# Patient Record
Sex: MALE | Race: WHITE | Employment: PART TIME | URBAN - METROPOLITAN AREA
[De-identification: names, ages, dates, MRNs, and addresses within clinical notes are randomized per-mention and may not be internally consistent; named-entity substitution may affect disease eponyms.]

---
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
## 2023-01-12 ENCOUNTER — OFFICE VISIT (OUTPATIENT)
Dept: FAMILY MEDICINE CLINIC | Age: 18
End: 2023-01-12
Payer: COMMERCIAL

## 2023-01-12 VITALS
WEIGHT: 260 LBS | SYSTOLIC BLOOD PRESSURE: 130 MMHG | HEART RATE: 86 BPM | OXYGEN SATURATION: 97 % | RESPIRATION RATE: 18 BRPM | TEMPERATURE: 97.7 F | DIASTOLIC BLOOD PRESSURE: 84 MMHG | BODY MASS INDEX: 35.21 KG/M2 | HEIGHT: 72 IN

## 2023-01-12 DIAGNOSIS — L03.032 PARONYCHIA OF GREAT TOE OF LEFT FOOT: ICD-10-CM

## 2023-01-12 DIAGNOSIS — L60.0 INGROWN LEFT GREATER TOENAIL: Primary | ICD-10-CM

## 2023-01-12 PROCEDURE — 99213 OFFICE O/P EST LOW 20 MIN: CPT | Performed by: PHYSICIAN ASSISTANT

## 2023-01-12 RX ORDER — DOXYCYCLINE HYCLATE 100 MG
100 TABLET ORAL 2 TIMES DAILY
Qty: 20 TABLET | Refills: 0 | Status: SHIPPED
Start: 2023-01-12 | End: 2023-01-13

## 2023-01-12 NOTE — PROGRESS NOTES
21  Rehan Smith : 2005 Sex: male  Age 21 y.o. Subjective:  Chief Complaint   Patient presents with    Foot Pain     Left Big toe bloody now swelling         HPI:   Mulugeta Meehan , 21 y.o. male presents to express care for evaluation of left great toe pain    HPI  78-year-old male presents to express care for evaluation of left great toe pain. The patient has now had the symptoms ongoing for last several days. The patient has been using Epson salts at home does not seem to be helping. The patient has had some drainage and discharge of the left great toe. The patient denies any numbness or tingling. The patient is not having any significant ankle pain or other injuries at this time. ROS:   Unless otherwise stated in this report the patient's positive and negative responses for review of systems for constitutional, eyes, ENT, cardiovascular, respiratory, gastrointestinal, neurological, , musculoskeletal, and integument systems and related systems to the presenting problem are either stated in the history of present illness or were not pertinent or were negative for the symptoms and/or complaints related to the presenting medical problem. Positives and pertinent negatives as per HPI. All others reviewed and are negative. PMH:     Past Medical History:   Diagnosis Date    Fatty liver     Seasonal allergies        No past surgical history on file. Family History   Problem Relation Age of Onset    Leukemia Maternal Grandmother     Heart Failure Maternal Grandmother     Lung Cancer Maternal Grandmother     Breast Cancer Maternal Grandmother     Colon Cancer Maternal Grandfather        Medications:     Current Outpatient Medications:     doxycycline hyclate (VIBRA-TABS) 100 MG tablet, Take 1 tablet by mouth 2 times daily for 10 days, Disp: 20 tablet, Rfl: 0    mupirocin (BACTROBAN) 2 % ointment, Apply topically 3 times daily. , Disp: 30 g, Rfl: 0    acetaminophen (TYLENOL) 500 MG tablet, Take 1 tablet by mouth 4 times daily as needed for Pain, Disp: 120 tablet, Rfl: 0    fluticasone (FLONASE) 50 MCG/ACT nasal spray, 1 spray by Each Nostril route daily, Disp: 32 g, Rfl: 1    levocetirizine (XYZAL) 5 MG tablet, Take 1 tablet by mouth nightly, Disp: 30 tablet, Rfl: 11    EPINEPHrine (EPIPEN 2-JENNIFER) 0.3 MG/0.3ML SOAJ injection, Inject 0.3 mLs into the muscle once for 1 dose Use as directed for allergic reaction, Disp: 0.3 mL, Rfl: 1    Allergies:   No Known Allergies    Social History:     Social History     Tobacco Use    Smoking status: Never    Smokeless tobacco: Never   Substance Use Topics    Alcohol use: Never    Drug use: Never       Patient lives at home. Physical Exam:     Vitals:    01/12/23 1534   BP: 130/84   Site: Right Upper Arm   Position: Sitting   Cuff Size: Medium Adult   Pulse: 86   Resp: 18   Temp: 97.7 °F (36.5 °C)   TempSrc: Temporal   SpO2: 97%   Weight: (!) 260 lb (117.9 kg)   Height: 6' (1.829 m)       Exam:  Physical Exam  Vital signs reviewed and nurse's notes. The patient is not hypoxic. General: Alert, no acute distress, patient resting comfortably   Skin: warm, intact, no pallor noted   Head: Normocephalic, atraumatic   Eye: Normal conjunctiva   Respiratory: No acute distress   Musculoskeletal: The patient has evidence of a ingrown toenail to the medial and lateral aspect of the toe. The patient does have evidence of some discharge noted on the medial nail fold area. There is quite a bit of erythema around the medial and proximal.  There is no significant tenderness noted on the lateral aspect. The patient has no evidence of lymphangitic streaking. Neurological: alert and orient x4, normal sensory and motor observed. Psychiatric: Cooperative      Testing:           Medical Decision Making:     Vital signs reviewed    Past medical history reviewed. Allergies reviewed. Medications reviewed.     Patient on arrival does not appear to be in any apparent distress or discomfort. The patient has been seen and evaluated. The patient does not appear to be toxic or lethargic. The patient has no obvious deformity noted to the left great toe or to the left foot. The patient does have quite a bit of swelling and erythema noted to the nail fold on the proximal and medial aspect. The patient does have evidence of some discharge noted in the medial aspect of the nail fold. The patient does have evidence of ingrown toenails. Patient pulses are intact. The patient has no lymphangitic streaking. The patient will be treated with doxycycline. The patient will be given mupirocin. We reached out to podiatry and they could see the patient tomorrow at 9:30 AM.    The patient is to return to express care or go directly to the emergency department should any of the signs or symptoms worsen. The patient is to followup with primary care physician in 2-3 days for repeat evaluation. The patient has no other questions or concerns at this time the patient will be discharged home. Clinical Impression:   Maria D Galarza was seen today for foot pain. Diagnoses and all orders for this visit:    Ingrown left greater toenail  -     Christelle Davis DPM, Podiatry, [address] [place] [address]    Paronychia of great toe of left foot    Other orders  -     doxycycline hyclate (VIBRA-TABS) 100 MG tablet; Take 1 tablet by mouth 2 times daily for 10 days  -     mupirocin (BACTROBAN) 2 % ointment; Apply topically 3 times daily. The patient is to call for any concerns or return if any of the signs or symptoms worsen. The patient is to follow-up with PCP in the next 2-3 days for repeat evaluation repeat assessment or go directly to the emergency department.      SIGNATURE: Safia Wolfe III, PA-C

## 2023-01-13 ENCOUNTER — OFFICE VISIT (OUTPATIENT)
Dept: PODIATRY | Age: 18
End: 2023-01-13

## 2023-01-13 ENCOUNTER — OFFICE VISIT (OUTPATIENT)
Dept: FAMILY MEDICINE CLINIC | Age: 18
End: 2023-01-13
Payer: COMMERCIAL

## 2023-01-13 VITALS
TEMPERATURE: 96.9 F | HEART RATE: 78 BPM | DIASTOLIC BLOOD PRESSURE: 72 MMHG | OXYGEN SATURATION: 97 % | SYSTOLIC BLOOD PRESSURE: 118 MMHG

## 2023-01-13 DIAGNOSIS — L03.032 CELLULITIS OF LEFT TOE: ICD-10-CM

## 2023-01-13 DIAGNOSIS — M79.675 PAIN OF LEFT GREAT TOE: ICD-10-CM

## 2023-01-13 DIAGNOSIS — L60.0 INGROWN NAIL: Primary | ICD-10-CM

## 2023-01-13 DIAGNOSIS — F41.0 PANIC ANXIETY SYNDROME: ICD-10-CM

## 2023-01-13 DIAGNOSIS — R55 VASOVAGAL EPISODE: Primary | ICD-10-CM

## 2023-01-13 DIAGNOSIS — R55 VASOVAGAL SYNCOPE: ICD-10-CM

## 2023-01-13 PROCEDURE — 99214 OFFICE O/P EST MOD 30 MIN: CPT | Performed by: INTERNAL MEDICINE

## 2023-01-13 ASSESSMENT — ENCOUNTER SYMPTOMS
ABDOMINAL PAIN: 0
COLOR CHANGE: 0
SHORTNESS OF BREATH: 1
EYES NEGATIVE: 1
VOMITING: 0
COUGH: 0
WHEEZING: 0
DIARRHEA: 0
NAUSEA: 1

## 2023-01-13 NOTE — PROGRESS NOTES
Patient in office with c/o ingrown toenail left great toe. Seen in [facility] care yesterday. Given abx at apt yesterday has not started yet. 21  Rehan Smith : 2005 Sex: male  Age: 21 y.o. Patient was referred by Prasanna Ram DO    CC:    Painful ingrown left great toenail both inside and outside borders    HPI:   This 23-year-old male patient referred today painful ingrown left great toenail both on the inside and outside. Was seen in [facility] and was started on oral antibiotic. States did not start his oral antibiotic yet. Does present with his mother. 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 Does have some drainage and redness. Symptoms present for the past week. Denies any right foot pain. Denies any known allergies. No additional pedal complaints at this time. ROS:  Const: Denies constitutional symptoms  Musculo: Denies symptoms other than stated above  Skin: Denies symptoms other than stated above       Current Outpatient Medications:     doxycycline hyclate (VIBRA-TABS) 100 MG tablet, Take 1 tablet by mouth 2 times daily for 10 days, Disp: 20 tablet, Rfl: 0    mupirocin (BACTROBAN) 2 % ointment, Apply topically 3 times daily. , Disp: 30 g, Rfl: 0    acetaminophen (TYLENOL) 500 MG tablet, Take 1 tablet by mouth 4 times daily as needed for Pain, Disp: 120 tablet, Rfl: 0    fluticasone (FLONASE) 50 MCG/ACT nasal spray, 1 spray by Each Nostril route daily, Disp: 32 g, Rfl: 1    levocetirizine (XYZAL) 5 MG tablet, Take 1 tablet by mouth nightly, Disp: 30 tablet, Rfl: 11    EPINEPHrine (EPIPEN 2-JENNIFER) 0.3 MG/0.3ML SOAJ injection, Inject 0.3 mLs into the muscle once for 1 dose Use as directed for allergic reaction, Disp: 0.3 mL, Rfl: 1  No Known Allergies    Past Medical History:   Diagnosis Date    Fatty liver     Seasonal allergies            There were no vitals filed for this visit.     Work History/Social History: Foot and ankle history:     Focused Lower Extremity Physical Exam:    Neurovascular examination:    Dorsalis Pedis palpable bilateral.  Posterior tibialis palpable bilateral.    Capillary Refill Time:  Immediate return  Hair growth:  Symmetrical and bilateral   Skin:  Not atrophic  Edema: No edema bilateral feet or ankles. Neurologic:  Light touch intact bilateral.      Musculoskeletal/ Orthopedic examination:    Equinis: Absent bilateral  Dorsiflexion, plantarflexion, inversion, eversion bilateral 5 out of 5 muscle strength  Wiggling toes  Negative Homans  Tenderness palpation both medial lateral border left great toe    Dermatology examination:    Incurvated and ingrown medial lateral border left great toenail. Erythema and drainage medial lateral border left great toe noted. Assessment and Plan:  Stephanie Cespedes was seen today for ingrown toenail. Diagnoses and all orders for this visit:    Ingrown nail    Cellulitis of left toe    Pain of left great toe    Seen today new referral ingrown and infected left great toenail medial lateral border  Mother present during entire visit. After verbal consent for nail avulsion, alcohol prep was used ethyl chloride used over injection site with 6cc 1% lidocaine plain injected dorsal approach medial and lateral left great toe in standard fashion. Betadine prep was used over the left great toe, tourniquet was applied. Sterile instrumentation was used with a freer to free the medial and lateral border of the ingrown portion of the toenail. English anvil used in standard linear technique to cut the ingrown portion of the nail. Hemostat was used to remove the ingrown portion of the toenail and passed to the back table. Phenol applied in 3 separate 30 second intervals in standard fashion at the base of the ingrown toenail. Alcohol was used to wash the entire digit. Patient's mother was in the room during entire visit.   He was very close to passing out after I took the nail out. We did lay him back in the chair and did give him water. Tourniquet was released. Postoperative instructions were discussed in detail. Keep dressing clean dry and intact until tonight. Remove bandage and apply bacitracin with band-aid each day for 1 week. After 1 week apply band-aid and continue to keep clean dry and intact. Avoid excess soaking and avoid pool or lake water. Limited activity over the next 24-48 hours. Oral antibiotic as prescribed yesterday from [facility]. Follow-up 2 weeks. Call sooner with any questions of concerns or any signs on increase drainage or redness. Patient did stand up and did sit on the floor in the hallway upon leaving. He did not pass out in the hallway but he did sit back down. Mother states he does have a history of \"panic attacks. \"  Dr. Arianna Martell was kind enough to come over and add him onto the schedule to see him today. Appreciate his input very much going forward. Return in about 2 weeks (around 1/27/2023). Seen By:  Dallin De Leon DPM      Document was created using voice recognition software. Note was reviewed, however may contain grammatical errors.

## 2023-01-14 NOTE — PROGRESS NOTES
[address] [place]     21  Rehan Smith : 2005 Sex: male  Age: 21 y.o. Chief Complaint   Patient presents with    Loss of Consciousness     Ingrown toenail removed by Dr Malcolm Bates       HPI    Patient comes to [facility] today after having seen podiatry and had ingrown toenail removed from left great toe when he developed lightheadedness and presyncope. I was asked to come see the patient. He was laying on the ground he was very lightheaded at first would not even speak to me although he was looking at his phone between his legs. He was breathing okay and vital signs were stable. I Tried to talk to him but he would not answer me and I finally told him if he was not going to do so I was calling an ambulance. He then started talking and cooperating. His mother was there as well who provided some history moral support for the patient. States that he has had allergic reactions in the past to unknown substances (not medication) for which he has had to use an EpiPen. Patient was initially hyperventilating stating that he was somewhat short of breath and was having some chest pain. Seemed to be very dramatic. We got him to calm down. We took him to the back and put him in the room. After he sat and relaxed and calm down. He was then doing fine. No significant medical history outside of what sounds like seasonal allergies and some allergic reactions that he admits to. Review of Systems   Constitutional:  Negative for chills, fatigue and fever. HENT: Negative. Eyes: Negative. Respiratory:  Positive for shortness of breath. Negative for cough and wheezing. Brief and saturating in the 90s the whole time   Cardiovascular:  Positive for chest pain. Negative for palpitations. Brief   Gastrointestinal:  Positive for nausea. Negative for abdominal pain, diarrhea and vomiting. Endocrine: Negative. Genitourinary: Negative. Musculoskeletal: Negative.     Skin: Negative for color change. Recent toenail removed   Neurological:  Positive for dizziness, syncope, weakness and light-headedness. Negative for tremors, seizures, speech difficulty, numbness and headaches. REST OF PERTINENT ROS GONE OVER AND WAS NEGATIVE. Current Outpatient Medications:     fluticasone (FLONASE) 50 MCG/ACT nasal spray, 1 spray by Each Nostril route daily, Disp: 32 g, Rfl: 1    levocetirizine (XYZAL) 5 MG tablet, Take 1 tablet by mouth nightly, Disp: 30 tablet, Rfl: 11    EPINEPHrine (EPIPEN 2-JENNIFER) 0.3 MG/0.3ML SOAJ injection, Inject 0.3 mLs into the muscle once for 1 dose Use as directed for allergic reaction, Disp: 0.3 mL, Rfl: 1  No Known Allergies    Past Medical History:   Diagnosis Date    Fatty liver     Seasonal allergies      No past surgical history on file.   Family History   Problem Relation Age of Onset    Leukemia Maternal Grandmother     Heart Failure Maternal Grandmother     Lung Cancer Maternal Grandmother     Breast Cancer Maternal Grandmother     Colon Cancer Maternal Grandfather      Social History     Socioeconomic History    Marital status: Single     Spouse name: Not on file    Number of children: Not on file    Years of education: Not on file    Highest education level: Not on file   Occupational History    Not on file   Tobacco Use    Smoking status: Never    Smokeless tobacco: Never   Substance and Sexual Activity    Alcohol use: Never    Drug use: Never    Sexual activity: Not Currently   Other Topics Concern    Not on file   Social History Narrative    Not on file     Social Determinants of Health     Financial Resource Strain: Low Risk     Difficulty of Paying Living Expenses: Not hard at all   Food Insecurity: No Food Insecurity    Worried About Running Out of Food in the Last Year: Never true    Ran Out of Food in the Last Year: Never true   Transportation Needs: Not on file   Physical Activity: Not on file   Stress: Not on file Social Connections: Not on file   Intimate Partner Violence: Not on file   Housing Stability: Not on file       Vitals:    01/13/23 1031 01/13/23 1057   BP: 108/78 118/72   Pulse: 78    Temp: 96.9 °F (36.1 °C)    SpO2: 97%        Physical Exam  Vitals and nursing note reviewed. Constitutional:       General: He is in acute distress. Comments: Emotional distress panic and anxiety   HENT:      Head: Normocephalic. Right Ear: Tympanic membrane, ear canal and external ear normal.      Left Ear: Tympanic membrane, ear canal and external ear normal.      Mouth/Throat:      Mouth: Mucous membranes are moist.      Pharynx: Oropharynx is clear. No posterior oropharyngeal erythema. Eyes:      Extraocular Movements: Extraocular movements intact. Pupils: Pupils are equal, round, and reactive to light. Cardiovascular:      Rate and Rhythm: Normal rate and regular rhythm. Pulses: Normal pulses. Heart sounds: Normal heart sounds. No murmur heard. Pulmonary:      Effort: Pulmonary effort is normal. No respiratory distress. Breath sounds: Normal breath sounds. No wheezing, rhonchi or rales. Abdominal:      General: Bowel sounds are normal.      Palpations: Abdomen is soft. Tenderness: There is no abdominal tenderness. Musculoskeletal:         General: No swelling, tenderness or signs of injury. Normal range of motion. Cervical back: Normal range of motion and neck supple. No tenderness. Skin:     General: Skin is warm and dry. Findings: No bruising, erythema or rash. Neurological:      General: No focal deficit present. Mental Status: He is alert and oriented to person, place, and time. Cranial Nerves: No cranial nerve deficit. Sensory: No sensory deficit. Motor: No weakness. Gait: Gait normal.   Psychiatric:      Comments: Very emotional and anxious. Assessment and Plan:  Tiffany Padron was seen today for loss of consciousness.     Diagnoses and all orders for this visit:    Vasovagal episode    Vasovagal syncope    Panic anxiety syndrome  Plan: I explained to both patient and mother felt this is most likely a vasovagal event related to his toenail removal.   Was fine at the end of examination and I walked him around the halls without any difficulty. I did not feel at this time further evaluation was needed. Simply asked the mother to keep an eye on him for the next 24 hours. To the emergency room with any further similar symptoms. Follow-up with PCP and notify us with problems in the interim. Encounter today with face-to-face timeIncluding reviewing everything with he and his mother, reviewing chart and medications as well as discussion with the podiatrist who took his toenail off regarding the case was 35 minutes. Return for fu pcp. Seen By:  Musa Perez MD      *Document was created using voice recognition software. Note was reviewed however may contain grammatical errors.

## 2023-02-23 ENCOUNTER — OFFICE VISIT (OUTPATIENT)
Dept: FAMILY MEDICINE CLINIC | Age: 18
End: 2023-02-23

## 2023-02-23 VITALS
WEIGHT: 258 LBS | OXYGEN SATURATION: 96 % | BODY MASS INDEX: 34.95 KG/M2 | SYSTOLIC BLOOD PRESSURE: 130 MMHG | RESPIRATION RATE: 20 BRPM | DIASTOLIC BLOOD PRESSURE: 92 MMHG | TEMPERATURE: 97.7 F | HEART RATE: 84 BPM | HEIGHT: 72 IN

## 2023-02-23 DIAGNOSIS — R09.81 NASAL CONGESTION: ICD-10-CM

## 2023-02-23 DIAGNOSIS — R07.0 PAIN IN THROAT: Primary | ICD-10-CM

## 2023-02-23 DIAGNOSIS — R42 LIGHTHEADED: ICD-10-CM

## 2023-02-23 LAB
Lab: NORMAL
PERFORMING INSTRUMENT: NORMAL
QC PASS/FAIL: NORMAL
S PYO AG THROAT QL: NORMAL
SARS-COV-2, POC: NORMAL

## 2023-02-23 NOTE — PROGRESS NOTES
21  Rehan Luis : 2005 Sex: male  Age 21 y.o. Subjective:  Chief Complaint   Patient presents with    Dizziness    Pharyngitis    Congestion         HPI:   [de-identified] , 21 y.o. male presents to express care for evaluation of dizziness, lightheaded, congestion    HPI  58-year-old male presents to express care for evaluation of congestion, drainage, lightheadedness. The patient has had these symptoms essentially for couple of hours. The patient was at work and started feeling lightheaded and dizzy. The patient started having a sore throat and congestion. No fever, chills. The patient is not really abdominal pain. No GI symptoms. The patient not currently on any antibiotics. ROS:   Unless otherwise stated in this report the patient's positive and negative responses for review of systems for constitutional, eyes, ENT, cardiovascular, respiratory, gastrointestinal, neurological, , musculoskeletal, and integument systems and related systems to the presenting problem are either stated in the history of present illness or were not pertinent or were negative for the symptoms and/or complaints related to the presenting medical problem. Positives and pertinent negatives as per HPI. All others reviewed and are negative. PMH:     Past Medical History:   Diagnosis Date    Fatty liver     Seasonal allergies        History reviewed. No pertinent surgical history.     Family History   Problem Relation Age of Onset    Leukemia Maternal Grandmother     Heart Failure Maternal Grandmother     Lung Cancer Maternal Grandmother     Breast Cancer Maternal Grandmother     Colon Cancer Maternal Grandfather        Medications:     Current Outpatient Medications:     fluticasone (FLONASE) 50 MCG/ACT nasal spray, 1 spray by Each Nostril route daily, Disp: 32 g, Rfl: 1    levocetirizine (XYZAL) 5 MG tablet, Take 1 tablet by mouth nightly, Disp: 30 tablet, Rfl: 11    EPINEPHrine (EPIPEN 2-JENNIFER) 0.3 MG/0.3ML SOAJ injection, Inject 0.3 mLs into the muscle once for 1 dose Use as directed for allergic reaction, Disp: 0.3 mL, Rfl: 1    Allergies:   No Known Allergies    Social History:     Social History     Tobacco Use    Smoking status: Never    Smokeless tobacco: Never   Substance Use Topics    Alcohol use: Never    Drug use: Never       Patient lives at home. Physical Exam:     Vitals:    02/23/23 1022   BP: (!) 130/92   Site: Right Upper Arm   Position: Sitting   Cuff Size: Medium Adult   Pulse: 84   Resp: 20   Temp: 97.7 °F (36.5 °C)   TempSrc: Temporal   SpO2: 96%   Weight: 258 lb (117 kg)   Height: 6' (1.829 m)       Exam:  Physical Exam  Nurse's notes and vital signs reviewed. The patient is not hypoxic. ? General: Alert, no acute distress, patient resting comfortably Patient is not toxic or lethargic. Skin: Warm, intact, no pallor noted. There is no evidence of rash at this time. Head: Normocephalic, atraumatic  Eye: Normal conjunctiva  Ears, Nose, Throat: Right tympanic membrane clear, left tympanic membrane clear. No drainage or discharge noted. No pre- or post-auricular tenderness, erythema, or swelling noted. Minimal congestion  Posterior oropharynx shows no erythema, tonsillar hypertrophy, or exudate. the uvula is midline. No trismus or drooling is noted. Moist mucous membranes. Neck: No anterior/posterior lymphadenopathy noted. No erythema, no masses, no fluctuance or induration noted. No meningeal signs. Cardiovascular: Regular Rate and Rhythm  Respiratory: No acute distress, no rhonchi, wheezing or crackles noted. No stridor or retractions are noted.   Neurological: A&O x4, normal speech  Psychiatric: Cooperative       Testing:     Results for orders placed or performed in visit on 02/23/23   POCT COVID-19, Antigen   Result Value Ref Range    SARS-COV-2, POC Not-Detected Not Detected    Lot Number [id number]     QC Pass/Fail Pass     Performing Instrument [employer] Veritor    POCT rapid strep A   Result Value Ref Range    Strep A Ag None Detected None Detected           Medical Decision Making:     Vital signs reviewed    Past medical history reviewed. Allergies reviewed. Medications reviewed. Patient on arrival does not appear to be in any apparent distress or discomfort. The patient has been seen and evaluated. The patient does not appear to be toxic or lethargic. COVID was negative    Strep negative    Offered the patient further work-up in the emergency department with labs, fluids, EKG the patient declining. I offered outpatient labs the patient declined. The patient would like to go home and try to drink fluids. The patient is to return to express care or go directly to the emergency department should any of the signs or symptoms worsen. The patient is to followup with primary care physician in 2-3 days for repeat evaluation. The patient has no other questions or concerns at this time the patient will be discharged home. Clinical Impression:   Anabell Matute was seen today for dizziness, pharyngitis and congestion. Diagnoses and all orders for this visit:    Pain in throat  -     POCT COVID-19, Antigen  -     POCT rapid strep A    Nasal congestion    Lightheaded      The patient is to call for any concerns or return if any of the signs or symptoms worsen. The patient is to follow-up with PCP in the next 2-3 days for repeat evaluation repeat assessment or go directly to the emergency department.      SIGNATURE: Von Mccullough III, PA-C

## 2023-05-03 ENCOUNTER — OFFICE VISIT (OUTPATIENT)
Dept: FAMILY MEDICINE CLINIC | Age: 18
End: 2023-05-03
Payer: COMMERCIAL

## 2023-05-03 VITALS
HEIGHT: 72 IN | SYSTOLIC BLOOD PRESSURE: 132 MMHG | WEIGHT: 254 LBS | OXYGEN SATURATION: 97 % | BODY MASS INDEX: 34.4 KG/M2 | HEART RATE: 82 BPM | RESPIRATION RATE: 18 BRPM | TEMPERATURE: 97.7 F | DIASTOLIC BLOOD PRESSURE: 84 MMHG

## 2023-05-03 DIAGNOSIS — R11.2 NAUSEA VOMITING AND DIARRHEA: ICD-10-CM

## 2023-05-03 DIAGNOSIS — R19.7 NAUSEA VOMITING AND DIARRHEA: ICD-10-CM

## 2023-05-03 DIAGNOSIS — R04.0 EPISTAXIS: ICD-10-CM

## 2023-05-03 DIAGNOSIS — R11.2 NAUSEA VOMITING AND DIARRHEA: Primary | ICD-10-CM

## 2023-05-03 DIAGNOSIS — R19.7 NAUSEA VOMITING AND DIARRHEA: Primary | ICD-10-CM

## 2023-05-03 LAB
ALBUMIN SERPL-MCNC: 4.6 G/DL (ref 3.5–5.2)
ALP SERPL-CCNC: 77 U/L (ref 40–129)
ALT SERPL-CCNC: 151 U/L (ref 0–40)
ANION GAP SERPL CALCULATED.3IONS-SCNC: 5 MMOL/L (ref 7–16)
AST SERPL-CCNC: 56 U/L (ref 0–39)
BASOPHILS # BLD: 0.05 E9/L (ref 0–0.2)
BASOPHILS NFR BLD: 0.6 % (ref 0–2)
BILIRUB SERPL-MCNC: 0.5 MG/DL (ref 0–1.2)
BUN SERPL-MCNC: 9 MG/DL (ref 6–20)
CALCIUM SERPL-MCNC: 9.9 MG/DL (ref 8.6–10.2)
CHLORIDE SERPL-SCNC: 103 MMOL/L (ref 98–107)
CO2 SERPL-SCNC: 27 MMOL/L (ref 22–29)
CREAT SERPL-MCNC: 0.8 MG/DL (ref 0.7–1.2)
EOSINOPHIL # BLD: 0.23 E9/L (ref 0.05–0.5)
EOSINOPHIL NFR BLD: 2.9 % (ref 0–6)
ERYTHROCYTE [DISTWIDTH] IN BLOOD BY AUTOMATED COUNT: 12.9 FL (ref 11.5–15)
GLUCOSE SERPL-MCNC: 98 MG/DL (ref 74–99)
HCT VFR BLD AUTO: 50.5 % (ref 37–54)
HGB BLD-MCNC: 16 G/DL (ref 12.5–16.5)
IMM GRANULOCYTES # BLD: 0.03 E9/L
IMM GRANULOCYTES NFR BLD: 0.4 % (ref 0–5)
LIPASE: 29 U/L (ref 13–60)
LYMPHOCYTES # BLD: 3.12 E9/L (ref 1.5–4)
LYMPHOCYTES NFR BLD: 39 % (ref 20–42)
MAGNESIUM SERPL-MCNC: 2.1 MG/DL (ref 1.6–2.6)
MCH RBC QN AUTO: 28.6 PG (ref 26–35)
MCHC RBC AUTO-ENTMCNC: 31.7 % (ref 32–34.5)
MCV RBC AUTO: 90.2 FL (ref 80–99.9)
MONOCYTES # BLD: 0.62 E9/L (ref 0.1–0.95)
MONOCYTES NFR BLD: 7.7 % (ref 2–12)
NEUTROPHILS # BLD: 3.96 E9/L (ref 1.8–7.3)
NEUTS SEG NFR BLD: 49.4 % (ref 43–80)
PLATELET # BLD AUTO: 326 E9/L (ref 130–450)
PMV BLD AUTO: 9 FL (ref 7–12)
POTASSIUM SERPL-SCNC: 4.3 MMOL/L (ref 3.5–5)
PROT SERPL-MCNC: 8.1 G/DL (ref 6.4–8.3)
RBC # BLD AUTO: 5.6 E12/L (ref 3.8–5.8)
SODIUM SERPL-SCNC: 135 MMOL/L (ref 132–146)
WBC # BLD: 8 E9/L (ref 4.5–11.5)

## 2023-05-03 PROCEDURE — 99214 OFFICE O/P EST MOD 30 MIN: CPT | Performed by: PHYSICIAN ASSISTANT

## 2023-05-03 RX ORDER — DICYCLOMINE HYDROCHLORIDE 10 MG/1
10 CAPSULE ORAL 4 TIMES DAILY PRN
Qty: 12 CAPSULE | Refills: 0 | Status: SHIPPED | OUTPATIENT
Start: 2023-05-03

## 2023-05-03 RX ORDER — ONDANSETRON 4 MG/1
4 TABLET, ORALLY DISINTEGRATING ORAL 3 TIMES DAILY PRN
Qty: 21 TABLET | Refills: 0 | Status: SHIPPED | OUTPATIENT
Start: 2023-05-03

## 2023-05-03 NOTE — PROGRESS NOTES
5/3/23  Rehan Luis : 2005 Sex: male  Age 21 y.o. Subjective:  Chief Complaint   Patient presents with    Abdominal Pain    Emesis    Nausea    Epistaxis         HPI:   Yesy Sheridan , 21 y.o. male presents to express care for evaluation of nausea, vomiting, diarrhea, epistaxis    HPI  20-year-old male presents to express care for evaluation of abdominal pain, nausea, vomiting, diarrhea. The patient started with the symptoms yesterday. The patient had about 5 total episodes of emesis. The patient has still confused continued to feel nauseous. The patient's had about 10 episodes of diarrhea. The patient is not having any hematemesis, coffee-ground emesis, Hematochezia or melena. The patient is having some intermittent crampy abdominal pains. The patient states that he had an episode of epistaxis this morning. Really does not have any history of epistaxis. He is not on any anticoagulants. The patient does not have any nasal bleeding currently. The patient does not have any fevers or chills. No other sick contacts around. ROS:   Unless otherwise stated in this report the patient's positive and negative responses for review of systems for constitutional, eyes, ENT, cardiovascular, respiratory, gastrointestinal, neurological, , musculoskeletal, and integument systems and related systems to the presenting problem are either stated in the history of present illness or were not pertinent or were negative for the symptoms and/or complaints related to the presenting medical problem. Positives and pertinent negatives as per HPI. All others reviewed and are negative. PMH:     Past Medical History:   Diagnosis Date    Fatty liver     Seasonal allergies        History reviewed. No pertinent surgical history.     Family History   Problem Relation Age of Onset    Leukemia Maternal Grandmother     Heart Failure Maternal Grandmother     Lung Cancer Maternal Grandmother     Breast Cancer Maternal

## 2023-09-05 ENCOUNTER — APPOINTMENT (OUTPATIENT)
Dept: GENERAL RADIOLOGY | Age: 18
End: 2023-09-05

## 2023-09-05 ENCOUNTER — HOSPITAL ENCOUNTER (EMERGENCY)
Age: 18
Discharge: HOME | End: 2023-09-05
Attending: EMERGENCY MEDICINE

## 2023-09-05 VITALS
SYSTOLIC BLOOD PRESSURE: 107 MMHG | DIASTOLIC BLOOD PRESSURE: 64 MMHG | OXYGEN SATURATION: 98 % | TEMPERATURE: 97.5 F | HEART RATE: 63 BPM | HEIGHT: 72 IN | WEIGHT: 240 LBS | BODY MASS INDEX: 32.51 KG/M2 | RESPIRATION RATE: 18 BRPM

## 2023-09-05 DIAGNOSIS — M79.661 PAIN IN BOTH LOWER LEGS: ICD-10-CM

## 2023-09-05 DIAGNOSIS — R11.0 NAUSEA: Primary | ICD-10-CM

## 2023-09-05 DIAGNOSIS — M79.662 PAIN IN BOTH LOWER LEGS: ICD-10-CM

## 2023-09-05 LAB
ALBUMIN SERPL-MCNC: 4.6 G/DL (ref 3.5–5.2)
ALP SERPL-CCNC: 66 U/L (ref 40–129)
ALT SERPL-CCNC: 118 U/L (ref 0–40)
ANION GAP SERPL CALCULATED.3IONS-SCNC: 13 MMOL/L (ref 7–16)
AST SERPL-CCNC: 52 U/L (ref 0–39)
BASOPHILS # BLD: 0.09 K/UL (ref 0–0.2)
BASOPHILS NFR BLD: 1 % (ref 0–2)
BILIRUB SERPL-MCNC: 0.7 MG/DL (ref 0–1.2)
BUN SERPL-MCNC: 10 MG/DL (ref 6–20)
CALCIUM SERPL-MCNC: 9.9 MG/DL (ref 8.6–10.2)
CHLORIDE SERPL-SCNC: 104 MMOL/L (ref 98–107)
CO2 SERPL-SCNC: 22 MMOL/L (ref 22–29)
CREAT SERPL-MCNC: 0.9 MG/DL (ref 0.7–1.2)
EOSINOPHIL # BLD: 0.33 K/UL (ref 0.05–0.5)
EOSINOPHILS RELATIVE PERCENT: 4 % (ref 0–6)
ERYTHROCYTE [DISTWIDTH] IN BLOOD BY AUTOMATED COUNT: 12.7 % (ref 11.5–15)
GFR SERPL CREATININE-BSD FRML MDRD: >60 ML/MIN/1.73M2
GLUCOSE BLD-MCNC: 120 MG/DL (ref 74–99)
GLUCOSE SERPL-MCNC: 127 MG/DL (ref 74–99)
HCT VFR BLD AUTO: 44.8 % (ref 37–54)
HGB BLD-MCNC: 15.2 G/DL (ref 12.5–16.5)
IMM GRANULOCYTES # BLD AUTO: <0.03 K/UL (ref 0–0.58)
IMM GRANULOCYTES NFR BLD: 0 % (ref 0–5)
LYMPHOCYTES NFR BLD: 4.34 K/UL (ref 1.5–4)
LYMPHOCYTES RELATIVE PERCENT: 47 % (ref 20–42)
MCH RBC QN AUTO: 28.8 PG (ref 26–35)
MCHC RBC AUTO-ENTMCNC: 33.9 G/DL (ref 32–34.5)
MCV RBC AUTO: 85 FL (ref 80–99.9)
MONOCYTES NFR BLD: 0.96 K/UL (ref 0.1–0.95)
MONOCYTES NFR BLD: 10 % (ref 2–12)
NEUTROPHILS NFR BLD: 38 % (ref 43–80)
NEUTS SEG NFR BLD: 3.55 K/UL (ref 1.8–7.3)
PLATELET # BLD AUTO: 328 K/UL (ref 130–450)
PMV BLD AUTO: 8.9 FL (ref 7–12)
POTASSIUM SERPL-SCNC: 3.9 MMOL/L (ref 3.5–5)
PROT SERPL-MCNC: 8.3 G/DL (ref 6.4–8.3)
RBC # BLD AUTO: 5.27 M/UL (ref 3.8–5.8)
SODIUM SERPL-SCNC: 139 MMOL/L (ref 132–146)
TROPONIN I SERPL HS-MCNC: 7 NG/L (ref 0–11)
WBC OTHER # BLD: 9.3 K/UL (ref 4.5–11.5)

## 2023-09-05 PROCEDURE — 96375 TX/PRO/DX INJ NEW DRUG ADDON: CPT

## 2023-09-05 PROCEDURE — 99285 EMERGENCY DEPT VISIT HI MDM: CPT

## 2023-09-05 PROCEDURE — 2580000003 HC RX 258

## 2023-09-05 PROCEDURE — 6360000002 HC RX W HCPCS: Performed by: EMERGENCY MEDICINE

## 2023-09-05 PROCEDURE — 96372 THER/PROPH/DIAG INJ SC/IM: CPT

## 2023-09-05 PROCEDURE — 6370000000 HC RX 637 (ALT 250 FOR IP)

## 2023-09-05 PROCEDURE — 72100 X-RAY EXAM L-S SPINE 2/3 VWS: CPT

## 2023-09-05 PROCEDURE — 96374 THER/PROPH/DIAG INJ IV PUSH: CPT

## 2023-09-05 PROCEDURE — 71045 X-RAY EXAM CHEST 1 VIEW: CPT

## 2023-09-05 PROCEDURE — 73521 X-RAY EXAM HIPS BI 2 VIEWS: CPT

## 2023-09-05 PROCEDURE — 80053 COMPREHEN METABOLIC PANEL: CPT

## 2023-09-05 PROCEDURE — 93005 ELECTROCARDIOGRAM TRACING: CPT

## 2023-09-05 PROCEDURE — 84484 ASSAY OF TROPONIN QUANT: CPT

## 2023-09-05 PROCEDURE — 6360000002 HC RX W HCPCS

## 2023-09-05 PROCEDURE — 82962 GLUCOSE BLOOD TEST: CPT

## 2023-09-05 PROCEDURE — 85025 COMPLETE CBC W/AUTO DIFF WBC: CPT

## 2023-09-05 RX ORDER — KETOROLAC TROMETHAMINE 30 MG/ML
30 INJECTION, SOLUTION INTRAMUSCULAR; INTRAVENOUS ONCE
Status: COMPLETED | OUTPATIENT
Start: 2023-09-05 | End: 2023-09-05

## 2023-09-05 RX ORDER — ONDANSETRON 2 MG/ML
4 INJECTION INTRAMUSCULAR; INTRAVENOUS EVERY 6 HOURS PRN
Status: DISCONTINUED | OUTPATIENT
Start: 2023-09-05 | End: 2023-09-05 | Stop reason: HOSPADM

## 2023-09-05 RX ORDER — ORPHENADRINE CITRATE 30 MG/ML
60 INJECTION INTRAMUSCULAR; INTRAVENOUS ONCE
Status: COMPLETED | OUTPATIENT
Start: 2023-09-05 | End: 2023-09-05

## 2023-09-05 RX ORDER — 0.9 % SODIUM CHLORIDE 0.9 %
1000 INTRAVENOUS SOLUTION INTRAVENOUS ONCE
Status: COMPLETED | OUTPATIENT
Start: 2023-09-05 | End: 2023-09-05

## 2023-09-05 RX ORDER — HYDROCODONE BITARTRATE AND ACETAMINOPHEN 5; 325 MG/1; MG/1
1 TABLET ORAL ONCE
Status: COMPLETED | OUTPATIENT
Start: 2023-09-05 | End: 2023-09-05

## 2023-09-05 RX ADMIN — ONDANSETRON 4 MG: 2 INJECTION INTRAMUSCULAR; INTRAVENOUS at 12:51

## 2023-09-05 RX ADMIN — KETOROLAC TROMETHAMINE 30 MG: 30 INJECTION, SOLUTION INTRAMUSCULAR; INTRAVENOUS at 13:23

## 2023-09-05 RX ADMIN — HYDROCODONE BITARTRATE AND ACETAMINOPHEN 1 TABLET: 5; 325 TABLET ORAL at 14:40

## 2023-09-05 RX ADMIN — ORPHENADRINE CITRATE 60 MG: 60 INJECTION INTRAMUSCULAR; INTRAVENOUS at 13:22

## 2023-09-05 RX ADMIN — SODIUM CHLORIDE 1000 ML: 9 INJECTION, SOLUTION INTRAVENOUS at 12:51

## 2023-09-05 ASSESSMENT — PAIN DESCRIPTION - LOCATION
LOCATION: BACK;LEG

## 2023-09-05 ASSESSMENT — PAIN - FUNCTIONAL ASSESSMENT: PAIN_FUNCTIONAL_ASSESSMENT: 0-10

## 2023-09-05 ASSESSMENT — PAIN SCALES - GENERAL
PAINLEVEL_OUTOF10: 6
PAINLEVEL_OUTOF10: 8
PAINLEVEL_OUTOF10: 8
PAINLEVEL_OUTOF10: 7

## 2023-09-05 ASSESSMENT — LIFESTYLE VARIABLES
HOW MANY STANDARD DRINKS CONTAINING ALCOHOL DO YOU HAVE ON A TYPICAL DAY: PATIENT DOES NOT DRINK
HOW OFTEN DO YOU HAVE A DRINK CONTAINING ALCOHOL: NEVER

## 2023-09-05 ASSESSMENT — PAIN DESCRIPTION - ORIENTATION
ORIENTATION: LOWER;RIGHT;LEFT

## 2023-09-05 NOTE — DISCHARGE INSTRUCTIONS
Follow-up with your PCP for further workup. Return to ED if worsening of symptoms or new concerning signs occur.

## 2023-09-06 LAB
EKG ATRIAL RATE: 58 BPM
EKG P AXIS: 15 DEGREES
EKG P-R INTERVAL: 142 MS
EKG Q-T INTERVAL: 432 MS
EKG QRS DURATION: 82 MS
EKG QTC CALCULATION (BAZETT): 424 MS
EKG R AXIS: 19 DEGREES
EKG T AXIS: 37 DEGREES
EKG VENTRICULAR RATE: 58 BPM

## 2023-09-06 PROCEDURE — 93010 ELECTROCARDIOGRAM REPORT: CPT | Performed by: INTERNAL MEDICINE

## 2024-01-02 ENCOUNTER — OFFICE VISIT (OUTPATIENT)
Dept: FAMILY MEDICINE CLINIC | Age: 19
End: 2024-01-02

## 2024-01-02 VITALS
DIASTOLIC BLOOD PRESSURE: 74 MMHG | HEART RATE: 78 BPM | TEMPERATURE: 97.6 F | WEIGHT: 242 LBS | OXYGEN SATURATION: 98 % | SYSTOLIC BLOOD PRESSURE: 122 MMHG | BODY MASS INDEX: 32.82 KG/M2

## 2024-01-02 DIAGNOSIS — R11.2 NAUSEA AND VOMITING, UNSPECIFIED VOMITING TYPE: ICD-10-CM

## 2024-01-02 DIAGNOSIS — J10.1 INFLUENZA A: Primary | ICD-10-CM

## 2024-01-02 LAB
INFLUENZA A ANTIBODY: POSITIVE
INFLUENZA B ANTIBODY: NEGATIVE
Lab: NORMAL
PERFORMING INSTRUMENT: NORMAL
QC PASS/FAIL: NORMAL
SARS-COV-2, POC: NORMAL

## 2024-01-02 PROCEDURE — 87426 SARSCOV CORONAVIRUS AG IA: CPT

## 2024-01-02 PROCEDURE — 96372 THER/PROPH/DIAG INJ SC/IM: CPT

## 2024-01-02 PROCEDURE — 99213 OFFICE O/P EST LOW 20 MIN: CPT

## 2024-01-02 PROCEDURE — 87804 INFLUENZA ASSAY W/OPTIC: CPT

## 2024-01-02 RX ORDER — OSELTAMIVIR PHOSPHATE 75 MG/1
75 CAPSULE ORAL 2 TIMES DAILY
Qty: 10 CAPSULE | Refills: 0 | Status: SHIPPED | OUTPATIENT
Start: 2024-01-02 | End: 2024-01-07

## 2024-01-02 RX ORDER — ONDANSETRON 4 MG/1
4 TABLET, ORALLY DISINTEGRATING ORAL 3 TIMES DAILY PRN
Qty: 21 TABLET | Refills: 0 | Status: SHIPPED | OUTPATIENT
Start: 2024-01-02

## 2024-01-02 RX ORDER — PROMETHAZINE HYDROCHLORIDE 25 MG/ML
12.5 INJECTION, SOLUTION INTRAMUSCULAR; INTRAVENOUS ONCE
Status: COMPLETED | OUTPATIENT
Start: 2024-01-02 | End: 2024-01-02

## 2024-01-02 RX ADMIN — PROMETHAZINE HYDROCHLORIDE 12.5 MG: 25 INJECTION, SOLUTION INTRAMUSCULAR; INTRAVENOUS at 12:49

## 2024-01-02 NOTE — PROGRESS NOTES
2024     Rehan Smith 18 y.o. male    : 2005   Chief Complaint:   Emesis (X3 days), Dizziness, Diarrhea, and Chills      History of Present Illness   Source of history provided by:  patient.    Rehan Smith is a 18 y.o. old male who presents to walk-in for evaluation of emesis x 3 days. Associated symptoms include diarrhea and chills.  Since onset symptoms have been consistent.  Patient has had no known Covid 19 exposure.  Patient has not been diagnosed with COVID-19 in the last 90 days.  Has taken nothing at home for symptomatic relief. Denies any fever, CP, dyspnea, LE edema, abdominal pain, nausea, vomiting, rash, or lethargy. Denies any history of asthma, pneumonia, recurrent bronchitis or COPD.  They have no history of tobacco abuse.        ROS   Past Medical History:   Past Medical History:   Diagnosis Date    Fatty liver     Seasonal allergies      Past Surgical History:  has no past surgical history on file.  Social History:  reports that he has never smoked. He has never used smokeless tobacco. He reports that he does not drink alcohol and does not use drugs.  Family History: family history includes Breast Cancer in his maternal grandmother; Colon Cancer in his maternal grandfather; Heart Failure in his maternal grandmother; Leukemia in his maternal grandmother; Lung Cancer in his maternal grandmother.   Allergies: Patient has no known allergies.    Unless otherwise stated in this report the patient's positive and negative responses for review of systems for constitutional, eyes, ENT, cardiovascular, respiratory, gastrointestinal, neurological, , musculoskeletal, and integument systems and related systems to the presenting problem are either stated in the history of present illness or were not pertinent or were negative for the symptoms and/or complaints related to the presenting medical problem.  Positives and pertinent negatives as per HPI.  All others reviewed and are

## 2024-01-22 ENCOUNTER — OFFICE VISIT (OUTPATIENT)
Dept: FAMILY MEDICINE CLINIC | Age: 19
End: 2024-01-22

## 2024-01-22 VITALS
WEIGHT: 243 LBS | BODY MASS INDEX: 32.96 KG/M2 | TEMPERATURE: 97.6 F | SYSTOLIC BLOOD PRESSURE: 120 MMHG | HEART RATE: 85 BPM | DIASTOLIC BLOOD PRESSURE: 80 MMHG | OXYGEN SATURATION: 96 %

## 2024-01-22 DIAGNOSIS — J01.90 ACUTE NON-RECURRENT SINUSITIS, UNSPECIFIED LOCATION: ICD-10-CM

## 2024-01-22 DIAGNOSIS — R09.82 POSTNASAL DRIP: ICD-10-CM

## 2024-01-22 DIAGNOSIS — J02.9 SORE THROAT: ICD-10-CM

## 2024-01-22 DIAGNOSIS — R05.9 COUGH, UNSPECIFIED TYPE: ICD-10-CM

## 2024-01-22 DIAGNOSIS — J06.9 ACUTE UPPER RESPIRATORY INFECTION, UNSPECIFIED: Primary | ICD-10-CM

## 2024-01-22 LAB
INFLUENZA A ANTIBODY: NORMAL
INFLUENZA B ANTIBODY: NORMAL
Lab: NORMAL
PERFORMING INSTRUMENT: NORMAL
QC PASS/FAIL: NORMAL
RSV ANTIGEN: NEGATIVE
S PYO AG THROAT QL: NORMAL
SARS-COV-2, POC: NORMAL

## 2024-01-22 PROCEDURE — 87880 STREP A ASSAY W/OPTIC: CPT | Performed by: PHYSICIAN ASSISTANT

## 2024-01-22 PROCEDURE — 87426 SARSCOV CORONAVIRUS AG IA: CPT | Performed by: PHYSICIAN ASSISTANT

## 2024-01-22 PROCEDURE — 99214 OFFICE O/P EST MOD 30 MIN: CPT | Performed by: PHYSICIAN ASSISTANT

## 2024-01-22 PROCEDURE — 87804 INFLUENZA ASSAY W/OPTIC: CPT | Performed by: PHYSICIAN ASSISTANT

## 2024-01-22 PROCEDURE — 86756 RESPIRATORY VIRUS ANTIBODY: CPT | Performed by: PHYSICIAN ASSISTANT

## 2024-01-22 RX ORDER — BENZONATATE 100 MG/1
100 CAPSULE ORAL 3 TIMES DAILY PRN
Qty: 21 CAPSULE | Refills: 0 | Status: SHIPPED | OUTPATIENT
Start: 2024-01-22 | End: 2024-01-29

## 2024-01-22 RX ORDER — DOXYCYCLINE HYCLATE 100 MG
100 TABLET ORAL 2 TIMES DAILY
Qty: 20 TABLET | Refills: 0 | Status: SHIPPED | OUTPATIENT
Start: 2024-01-22 | End: 2024-02-01

## 2024-01-22 NOTE — PROGRESS NOTES
Medical History:   Diagnosis Date    Fatty liver     Seasonal allergies        History reviewed. No pertinent surgical history.    Family History   Problem Relation Age of Onset    Leukemia Maternal Grandmother     Heart Failure Maternal Grandmother     Lung Cancer Maternal Grandmother     Breast Cancer Maternal Grandmother     Colon Cancer Maternal Grandfather        Medications:     Current Outpatient Medications:     doxycycline hyclate (VIBRA-TABS) 100 MG tablet, Take 1 tablet by mouth 2 times daily for 10 days, Disp: 20 tablet, Rfl: 0    benzonatate (TESSALON) 100 MG capsule, Take 1 capsule by mouth 3 times daily as needed for Cough, Disp: 21 capsule, Rfl: 0    ondansetron (ZOFRAN-ODT) 4 MG disintegrating tablet, Take 1 tablet by mouth 3 times daily as needed for Nausea or Vomiting, Disp: 21 tablet, Rfl: 0    dicyclomine (BENTYL) 10 MG capsule, Take 1 capsule by mouth 4 times daily as needed (stomach cramps) (Patient not taking: Reported on 1/2/2024), Disp: 12 capsule, Rfl: 0    EPINEPHrine (EPIPEN 2-JENNIFER) 0.3 MG/0.3ML SOAJ injection, Inject 0.3 mLs into the muscle once for 1 dose Use as directed for allergic reaction, Disp: 0.3 mL, Rfl: 1    Allergies:   No Known Allergies    Social History:     Social History     Tobacco Use    Smoking status: Never    Smokeless tobacco: Never   Substance Use Topics    Alcohol use: Never    Drug use: Never       Patient lives at home.    Physical Exam:     Vitals:    01/22/24 0841   BP: 120/80   Site: Right Upper Arm   Position: Sitting   Pulse: 85   Temp: 97.6 °F (36.4 °C)   TempSrc: Temporal   SpO2: 96%   Weight: 110.2 kg (243 lb)       Exam:  Physical Exam  Nurse's notes and vital signs reviewed. The patient is not hypoxic.  ?  General: Alert, no acute distress, patient resting comfortably Patient is not toxic or lethargic.  Skin: Warm, intact, no pallor noted. There is no evidence of rash at this time.  Head: Normocephalic, atraumatic  Eye: Normal conjunctiva  Ears, Nose,

## 2024-02-25 ENCOUNTER — HOSPITAL ENCOUNTER (EMERGENCY)
Age: 19
Discharge: HOME | End: 2024-02-25
Attending: EMERGENCY MEDICINE

## 2024-02-25 ENCOUNTER — APPOINTMENT (OUTPATIENT)
Dept: GENERAL RADIOLOGY | Age: 19
End: 2024-02-25

## 2024-02-25 VITALS
TEMPERATURE: 97.8 F | BODY MASS INDEX: 30.48 KG/M2 | SYSTOLIC BLOOD PRESSURE: 132 MMHG | RESPIRATION RATE: 14 BRPM | WEIGHT: 230 LBS | HEIGHT: 73 IN | OXYGEN SATURATION: 94 % | DIASTOLIC BLOOD PRESSURE: 90 MMHG | HEART RATE: 73 BPM

## 2024-02-25 DIAGNOSIS — R07.9 CHEST PAIN, UNSPECIFIED TYPE: Primary | ICD-10-CM

## 2024-02-25 LAB
ALBUMIN SERPL-MCNC: 4.6 G/DL (ref 3.5–5.2)
ALP SERPL-CCNC: 64 U/L (ref 40–129)
ALT SERPL-CCNC: 102 U/L (ref 0–40)
ANION GAP SERPL CALCULATED.3IONS-SCNC: 10 MMOL/L (ref 7–16)
AST SERPL-CCNC: 40 U/L (ref 0–39)
BASOPHILS # BLD: 0.05 K/UL (ref 0–0.2)
BASOPHILS NFR BLD: 1 % (ref 0–2)
BILIRUB SERPL-MCNC: 0.9 MG/DL (ref 0–1.2)
BNP SERPL-MCNC: <36 PG/ML (ref 0–125)
BUN SERPL-MCNC: 9 MG/DL (ref 6–20)
CALCIUM SERPL-MCNC: 9.9 MG/DL (ref 8.6–10.2)
CHLORIDE SERPL-SCNC: 105 MMOL/L (ref 98–107)
CO2 SERPL-SCNC: 24 MMOL/L (ref 22–29)
CREAT SERPL-MCNC: 0.8 MG/DL (ref 0.7–1.2)
EOSINOPHIL # BLD: 0.16 K/UL (ref 0.05–0.5)
EOSINOPHILS RELATIVE PERCENT: 2 % (ref 0–6)
ERYTHROCYTE [DISTWIDTH] IN BLOOD BY AUTOMATED COUNT: 12.8 % (ref 11.5–15)
GFR SERPL CREATININE-BSD FRML MDRD: >60 ML/MIN/1.73M2
GLUCOSE SERPL-MCNC: 103 MG/DL (ref 74–99)
HCT VFR BLD AUTO: 44.3 % (ref 37–54)
HGB BLD-MCNC: 14.5 G/DL (ref 12.5–16.5)
IMM GRANULOCYTES # BLD AUTO: <0.03 K/UL (ref 0–0.58)
IMM GRANULOCYTES NFR BLD: 0 % (ref 0–5)
LYMPHOCYTES NFR BLD: 2.62 K/UL (ref 1.5–4)
LYMPHOCYTES RELATIVE PERCENT: 39 % (ref 20–42)
MAGNESIUM SERPL-MCNC: 2.3 MG/DL (ref 1.6–2.6)
MCH RBC QN AUTO: 28.5 PG (ref 26–35)
MCHC RBC AUTO-ENTMCNC: 32.7 G/DL (ref 32–34.5)
MCV RBC AUTO: 87 FL (ref 80–99.9)
MONOCYTES NFR BLD: 0.62 K/UL (ref 0.1–0.95)
MONOCYTES NFR BLD: 9 % (ref 2–12)
NEUTROPHILS NFR BLD: 48 % (ref 43–80)
NEUTS SEG NFR BLD: 3.18 K/UL (ref 1.8–7.3)
PLATELET # BLD AUTO: 296 K/UL (ref 130–450)
PMV BLD AUTO: 8.4 FL (ref 7–12)
POTASSIUM SERPL-SCNC: 3.9 MMOL/L (ref 3.5–5)
PROT SERPL-MCNC: 8.1 G/DL (ref 6.4–8.3)
RBC # BLD AUTO: 5.09 M/UL (ref 3.8–5.8)
SODIUM SERPL-SCNC: 139 MMOL/L (ref 132–146)
TROPONIN I SERPL HS-MCNC: 7 NG/L (ref 0–11)
WBC OTHER # BLD: 6.7 K/UL (ref 4.5–11.5)

## 2024-02-25 PROCEDURE — 93005 ELECTROCARDIOGRAM TRACING: CPT | Performed by: NURSE PRACTITIONER

## 2024-02-25 PROCEDURE — 80053 COMPREHEN METABOLIC PANEL: CPT

## 2024-02-25 PROCEDURE — 85025 COMPLETE CBC W/AUTO DIFF WBC: CPT

## 2024-02-25 PROCEDURE — 83880 ASSAY OF NATRIURETIC PEPTIDE: CPT

## 2024-02-25 PROCEDURE — 71046 X-RAY EXAM CHEST 2 VIEWS: CPT

## 2024-02-25 PROCEDURE — 84484 ASSAY OF TROPONIN QUANT: CPT

## 2024-02-25 PROCEDURE — 83735 ASSAY OF MAGNESIUM: CPT

## 2024-02-25 PROCEDURE — 99285 EMERGENCY DEPT VISIT HI MDM: CPT

## 2024-02-25 RX ORDER — HYDROXYZINE PAMOATE 25 MG/1
25 CAPSULE ORAL NIGHTLY PRN
Qty: 15 CAPSULE | Refills: 0 | Status: SHIPPED | OUTPATIENT
Start: 2024-02-25 | End: 2024-03-11

## 2024-02-25 ASSESSMENT — LIFESTYLE VARIABLES
HOW OFTEN DO YOU HAVE A DRINK CONTAINING ALCOHOL: NEVER
HOW MANY STANDARD DRINKS CONTAINING ALCOHOL DO YOU HAVE ON A TYPICAL DAY: PATIENT DOES NOT DRINK

## 2024-02-25 NOTE — ED PROVIDER NOTES
drugs.    Family History: family history includes Breast Cancer in his maternal grandmother; Colon Cancer in his maternal grandfather; Heart Failure in his maternal grandmother; Leukemia in his maternal grandmother; Lung Cancer in his maternal grandmother.     The patient’s home medications have been reviewed.    Allergies: Patient has no known allergies.    REVIEW OF EXTERNAL NOTE :      Chart reviewed: On 1/22/2024 patient went to outpatient follow-up for acute upper respiratory infection.    Previous Echo reviewed by me:  No results found for: \"LVEF\", \"LVEFMODE\"     No results found for this or any previous visit.       Controlled Substance Monitored by me:   Acute and Chronic Pain Monitoring:        No data to display                ---------------------------------------------------PHYSICAL EXAM-------------------------------------    Constitutional/General: Alert and oriented x3, well appearing, non toxic in NAD  Head: Normocephalic and atraumatic  Eyes: EOMI, PERRL  Mouth: Oropharynx clear, handling secretions, no trismus  Neck: Supple, full ROM, no nuchal rigidity, no meningeal signs  Pulmonary: Lungs clear to auscultation bilaterally, no wheezes, rales, or rhonchi. Not in respiratory distress  Cardiovascular:  Regular rate. Regular rhythm. No murmurs. +2 distal pulses  Chest: no chest wall tenderness  Abdomen: Soft.  Non tender. Non distended.   No rebound, guarding, or rigidity. No pulsatile masses appreciated.  Musculoskeletal: Moves all extremities x 4.  Neurovascularly intact. Warm and well perfused, no clubbing, cyanosis, or edema. Compartments are soft and compressible. Capillary refill <3 seconds.  Skin: warm and dry. No rashes.   Neurologic: GCS 15, no gross focal neurologic deficits.     -------------------------------------------------- RESULTS -------------------------------------------------  I have personally reviewed all laboratory and imaging results for this patient. Results are listed 
Unequal limb length (acquired), unspecified site

## 2024-02-26 LAB
EKG ATRIAL RATE: 68 BPM
EKG P AXIS: 18 DEGREES
EKG P-R INTERVAL: 152 MS
EKG Q-T INTERVAL: 396 MS
EKG QRS DURATION: 82 MS
EKG QTC CALCULATION (BAZETT): 421 MS
EKG R AXIS: 3 DEGREES
EKG T AXIS: 43 DEGREES
EKG VENTRICULAR RATE: 68 BPM

## 2024-02-26 PROCEDURE — 93010 ELECTROCARDIOGRAM REPORT: CPT | Performed by: INTERNAL MEDICINE

## 2024-05-24 ENCOUNTER — OFFICE VISIT (OUTPATIENT)
Dept: FAMILY MEDICINE CLINIC | Age: 19
End: 2024-05-24
Payer: COMMERCIAL

## 2024-05-24 VITALS
DIASTOLIC BLOOD PRESSURE: 78 MMHG | SYSTOLIC BLOOD PRESSURE: 116 MMHG | WEIGHT: 226.6 LBS | TEMPERATURE: 97.8 F | OXYGEN SATURATION: 97 % | BODY MASS INDEX: 29.9 KG/M2 | HEART RATE: 91 BPM

## 2024-05-24 DIAGNOSIS — J02.0 STREP PHARYNGITIS: ICD-10-CM

## 2024-05-24 DIAGNOSIS — R19.7 NAUSEA VOMITING AND DIARRHEA: ICD-10-CM

## 2024-05-24 DIAGNOSIS — R50.9 FEVER, UNSPECIFIED FEVER CAUSE: Primary | ICD-10-CM

## 2024-05-24 DIAGNOSIS — R11.2 NAUSEA VOMITING AND DIARRHEA: ICD-10-CM

## 2024-05-24 LAB
INFLUENZA A ANTIBODY: NEGATIVE
INFLUENZA B ANTIBODY: NEGATIVE
Lab: NORMAL
PERFORMING INSTRUMENT: NORMAL
QC PASS/FAIL: NORMAL
S PYO AG THROAT QL: POSITIVE
SARS-COV-2, POC: NORMAL

## 2024-05-24 PROCEDURE — 87426 SARSCOV CORONAVIRUS AG IA: CPT | Performed by: PHYSICIAN ASSISTANT

## 2024-05-24 PROCEDURE — 99214 OFFICE O/P EST MOD 30 MIN: CPT | Performed by: PHYSICIAN ASSISTANT

## 2024-05-24 PROCEDURE — 87804 INFLUENZA ASSAY W/OPTIC: CPT | Performed by: PHYSICIAN ASSISTANT

## 2024-05-24 PROCEDURE — 87880 STREP A ASSAY W/OPTIC: CPT | Performed by: PHYSICIAN ASSISTANT

## 2024-05-24 RX ORDER — ONDANSETRON 4 MG/1
4 TABLET, ORALLY DISINTEGRATING ORAL 3 TIMES DAILY PRN
Qty: 21 TABLET | Refills: 0 | Status: SHIPPED | OUTPATIENT
Start: 2024-05-24

## 2024-05-24 RX ORDER — AMOXICILLIN 500 MG/1
500 CAPSULE ORAL 2 TIMES DAILY
Qty: 20 CAPSULE | Refills: 0 | Status: SHIPPED | OUTPATIENT
Start: 2024-05-24 | End: 2024-06-03

## 2024-05-24 RX ORDER — DICYCLOMINE HYDROCHLORIDE 10 MG/1
10 CAPSULE ORAL 4 TIMES DAILY PRN
Qty: 360 CAPSULE | Refills: 0 | Status: SHIPPED
Start: 2024-05-24 | End: 2024-05-24

## 2024-05-24 RX ORDER — DICYCLOMINE HYDROCHLORIDE 10 MG/1
10 CAPSULE ORAL 4 TIMES DAILY PRN
Qty: 20 CAPSULE | Refills: 0 | Status: SHIPPED | OUTPATIENT
Start: 2024-05-24

## 2024-05-24 NOTE — PROGRESS NOTES
24  Rehan Smith : 2005 Sex: male  Age 18 y.o.      Subjective:  Chief Complaint   Patient presents with    Emesis     Started yesterday    Fever    Abdominal Pain    Diarrhea         HPI:   HPI  Rehan Smith , 18 y.o. male presents to express care for evaluation of nausea, vomiting, diarrhea, fever, abdominal pain.  The patient started with the symptoms yesterday.    The patient states that anytime he ate or drink anything yesterday he was throwing up.  The patient states that he subsequently had episodes of diarrhea almost every single time he was throwing up.  The patient has had multiple episodes of both.  The patient states it has been more diarrhea than anything.  The patient has been able to consume fluids.  The patient is having some intermittent abdominal pain.  The patient is not having any fevers but has had chills.  The patient has not had any previous abdominal surgeries.      ROS:   Unless otherwise stated in this report the patient's positive and negative responses for review of systems for constitutional, eyes, ENT, cardiovascular, respiratory, gastrointestinal, neurological, , musculoskeletal, and integument systems and related systems to the presenting problem are either stated in the history of present illness or were not pertinent or were negative for the symptoms and/or complaints related to the presenting medical problem.  Positives and pertinent negatives as per HPI.  All others reviewed and are negative.      PMH:     Past Medical History:   Diagnosis Date    Fatty liver     Seasonal allergies        History reviewed. No pertinent surgical history.    Family History   Problem Relation Age of Onset    Leukemia Maternal Grandmother     Heart Failure Maternal Grandmother     Lung Cancer Maternal Grandmother     Breast Cancer Maternal Grandmother     Colon Cancer Maternal Grandfather        Medications:     Current Outpatient Medications:     amoxicillin (AMOXIL) 500 MG capsule, Take

## 2024-06-02 ENCOUNTER — APPOINTMENT (OUTPATIENT)
Dept: GENERAL RADIOLOGY | Age: 19
End: 2024-06-02
Payer: COMMERCIAL

## 2024-06-02 ENCOUNTER — HOSPITAL ENCOUNTER (EMERGENCY)
Age: 19
Discharge: HOME | End: 2024-06-02
Payer: COMMERCIAL

## 2024-06-02 VITALS
TEMPERATURE: 97.3 F | HEART RATE: 89 BPM | WEIGHT: 225 LBS | BODY MASS INDEX: 29.69 KG/M2 | SYSTOLIC BLOOD PRESSURE: 125 MMHG | OXYGEN SATURATION: 98 % | RESPIRATION RATE: 16 BRPM | DIASTOLIC BLOOD PRESSURE: 77 MMHG
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
## 2024-06-02 DIAGNOSIS — R22.9 LOCALIZED SOFT TISSUE SWELLING: ICD-10-CM

## 2024-06-02 DIAGNOSIS — M25.471 RIGHT ANKLE SWELLING: ICD-10-CM

## 2024-06-02 DIAGNOSIS — S80.861A NONVENOMOUS INSECT BITE OF RIGHT LOWER EXTREMITY, INITIAL ENCOUNTER: Primary | ICD-10-CM

## 2024-06-02 PROCEDURE — 96372 THER/PROPH/DIAG INJ SC/IM: CPT

## 2024-06-02 PROCEDURE — 73610 X-RAY EXAM OF ANKLE: CPT

## 2024-06-02 PROCEDURE — 6360000002 HC RX W HCPCS: Performed by: PHYSICIAN ASSISTANT

## 2024-06-02 PROCEDURE — 99284 EMERGENCY DEPT VISIT MOD MDM: CPT

## 2024-06-02 RX ORDER — PREDNISONE 20 MG/1
40 TABLET ORAL DAILY
Qty: 10 TABLET | Refills: 0 | Status: SHIPPED | OUTPATIENT
Start: 2024-06-02 | End: 2024-06-07

## 2024-06-02 RX ORDER — TRIAMCINOLONE ACETONIDE 5 MG/G
CREAM TOPICAL
Qty: 60 G | Refills: 1 | Status: SHIPPED | OUTPATIENT
Start: 2024-06-02 | End: 2024-06-09

## 2024-06-02 RX ORDER — DEXAMETHASONE SODIUM PHOSPHATE 10 MG/ML
10 INJECTION INTRAMUSCULAR; INTRAVENOUS ONCE
Status: COMPLETED | OUTPATIENT
Start: 2024-06-02 | End: 2024-06-02

## 2024-06-02 RX ORDER — DIPHENHYDRAMINE HCL 25 MG
25 CAPSULE ORAL EVERY 6 HOURS PRN
Qty: 30 CAPSULE | Refills: 0 | Status: SHIPPED | OUTPATIENT
Start: 2024-06-02

## 2024-06-02 RX ADMIN — DEXAMETHASONE SODIUM PHOSPHATE 10 MG: 10 INJECTION INTRAMUSCULAR; INTRAVENOUS at 18:44

## 2024-06-02 ASSESSMENT — PAIN DESCRIPTION - ORIENTATION: ORIENTATION: RIGHT

## 2024-06-02 ASSESSMENT — PAIN DESCRIPTION - DESCRIPTORS: DESCRIPTORS: DISCOMFORT;THROBBING;SORE

## 2024-06-02 ASSESSMENT — PAIN SCALES - GENERAL: PAINLEVEL_OUTOF10: 8

## 2024-06-02 ASSESSMENT — PAIN - FUNCTIONAL ASSESSMENT: PAIN_FUNCTIONAL_ASSESSMENT: 0-10

## 2024-06-02 NOTE — ED PROVIDER NOTES
Decadron and will be sent home with prednisone, Benadryl, and triamcinolone cream for the superficial itching.  He will be given PCP for follow-up.  He is educated on signs symptoms that would require emergent return to the ED.  Patient was in no distress at discharge and able to ambulate of department no difficulty.  Vital stable.    Patient was given the following medications:  Medications   dexAMETHasone (DECADRON) injection 10 mg (10 mg IntraMUSCular Given 6/2/24 1844)      Differential diagnoses included but not limited to insect bite, cellulitis, ankle sprain, ankle fracture, joint effusion    Reassessment:  Patient continues to be non-toxic on re-evaluation.     Chronic Conditions:   Past Medical History:   Diagnosis Date    Fatty liver     Seasonal allergies      ED COURSE:      Any labs and imaging were reviewed by myself.     Consultations:  None  Discussion with Other Profesionals : None      COUNSELING:   I have spoken with the patient/caregiver and discussed today’s results, in addition to providing specific details for the plan of care and counseling regarding the diagnosis and prognosis and are agreeable with the plan. All results reviewed with pt and all questions answered.  I discussed the differential, results and discharge plan with the patient and/or family/friend/caregiver if present.  I emphasized the importance of follow-up with the physician I referred them to in the timeframe recommended.  I explained reasons for the patient to return to the Emergency Department. Additional verbal discharge instructions were also given and discussed with the patient to supplement those generated by the EMR. We also discussed medications that were prescribed (if any) including common side effects and interactions.. All questions were addressed.  They understand return precautions and discharge instructions. The patient and/or family/friend/caregiver expressed understanding. Vitals were stable and they were in

## 2024-09-13 ENCOUNTER — HOSPITAL ENCOUNTER (EMERGENCY)
Age: 19
Discharge: HOME | End: 2024-09-13
Attending: EMERGENCY MEDICINE

## 2024-09-13 VITALS
RESPIRATION RATE: 18 BRPM | OXYGEN SATURATION: 97 % | SYSTOLIC BLOOD PRESSURE: 114 MMHG | HEIGHT: 73 IN | TEMPERATURE: 98.1 F | BODY MASS INDEX: 29.82 KG/M2 | HEART RATE: 80 BPM | WEIGHT: 225 LBS | DIASTOLIC BLOOD PRESSURE: 65 MMHG

## 2024-09-13 DIAGNOSIS — T78.2XXA ANAPHYLAXIS, INITIAL ENCOUNTER: Primary | ICD-10-CM

## 2024-09-13 LAB
EKG ATRIAL RATE: 87 BPM
EKG P AXIS: 52 DEGREES
EKG P-R INTERVAL: 150 MS
EKG Q-T INTERVAL: 376 MS
EKG QRS DURATION: 82 MS
EKG QTC CALCULATION (BAZETT): 452 MS
EKG R AXIS: -14 DEGREES
EKG T AXIS: 56 DEGREES
EKG VENTRICULAR RATE: 87 BPM

## 2024-09-13 PROCEDURE — 99284 EMERGENCY DEPT VISIT MOD MDM: CPT

## 2024-09-13 PROCEDURE — 93005 ELECTROCARDIOGRAM TRACING: CPT

## 2024-09-13 PROCEDURE — 96374 THER/PROPH/DIAG INJ IV PUSH: CPT

## 2024-09-13 PROCEDURE — 6360000002 HC RX W HCPCS

## 2024-09-13 PROCEDURE — 96375 TX/PRO/DX INJ NEW DRUG ADDON: CPT

## 2024-09-13 PROCEDURE — 2500000003 HC RX 250 WO HCPCS

## 2024-09-13 RX ORDER — EPINEPHRINE 0.3 MG/.3ML
0.3 INJECTION SUBCUTANEOUS ONCE
Qty: 0.3 ML | Refills: 0 | Status: SHIPPED | OUTPATIENT
Start: 2024-09-13 | End: 2024-09-13

## 2024-09-13 RX ORDER — METHYLPREDNISOLONE SODIUM SUCCINATE 125 MG/2ML
INJECTION, POWDER, LYOPHILIZED, FOR SOLUTION INTRAMUSCULAR; INTRAVENOUS
Status: COMPLETED
Start: 2024-09-13 | End: 2024-09-13

## 2024-09-13 RX ORDER — FAMOTIDINE 20 MG/1
20 TABLET, FILM COATED ORAL 2 TIMES DAILY
Qty: 14 TABLET | Refills: 0 | Status: SHIPPED | OUTPATIENT
Start: 2024-09-13 | End: 2024-09-20

## 2024-09-13 RX ORDER — FAMOTIDINE 10 MG/ML
INJECTION, SOLUTION INTRAVENOUS
Status: COMPLETED
Start: 2024-09-13 | End: 2024-09-13

## 2024-09-13 RX ORDER — METHYLPREDNISOLONE 4 MG
TABLET, DOSE PACK ORAL
Qty: 21 TABLET | Refills: 0 | Status: SHIPPED | OUTPATIENT
Start: 2024-09-13 | End: 2024-09-19

## 2024-09-13 RX ORDER — EPINEPHRINE 1 MG/ML
INJECTION, SOLUTION, CONCENTRATE INTRAVENOUS
Status: COMPLETED
Start: 2024-09-13 | End: 2024-09-13

## 2024-09-13 RX ADMIN — METHYLPREDNISOLONE SODIUM SUCCINATE 125 MG: 125 INJECTION INTRAMUSCULAR; INTRAVENOUS at 17:02

## 2024-09-13 RX ADMIN — FAMOTIDINE 20 MG: 10 INJECTION, SOLUTION INTRAVENOUS at 17:01

## 2024-09-13 RX ADMIN — EPINEPHRINE 0.3 MG: 1 INJECTION, SOLUTION, CONCENTRATE INTRAVENOUS at 17:02

## 2024-09-13 ASSESSMENT — PAIN DESCRIPTION - LOCATION: LOCATION: RIB CAGE

## 2024-09-13 ASSESSMENT — PAIN DESCRIPTION - ORIENTATION: ORIENTATION: RIGHT;LEFT

## 2024-09-13 ASSESSMENT — PAIN - FUNCTIONAL ASSESSMENT: PAIN_FUNCTIONAL_ASSESSMENT: 0-10

## 2024-09-13 ASSESSMENT — PAIN SCALES - GENERAL: PAINLEVEL_OUTOF10: 4

## 2024-09-13 ASSESSMENT — PAIN DESCRIPTION - DESCRIPTORS: DESCRIPTORS: SORE

## 2024-09-16 PROCEDURE — 93010 ELECTROCARDIOGRAM REPORT: CPT | Performed by: INTERNAL MEDICINE

## 2024-09-25 ENCOUNTER — OFFICE VISIT (OUTPATIENT)
Dept: FAMILY MEDICINE CLINIC | Age: 19
End: 2024-09-25

## 2024-09-25 VITALS
WEIGHT: 230 LBS | HEIGHT: 73 IN | SYSTOLIC BLOOD PRESSURE: 130 MMHG | HEART RATE: 104 BPM | BODY MASS INDEX: 30.48 KG/M2 | DIASTOLIC BLOOD PRESSURE: 78 MMHG | OXYGEN SATURATION: 96 % | TEMPERATURE: 98 F

## 2024-09-25 DIAGNOSIS — R05.9 COUGH, UNSPECIFIED TYPE: Primary | ICD-10-CM

## 2024-09-25 DIAGNOSIS — J40 SINOBRONCHITIS: ICD-10-CM

## 2024-09-25 DIAGNOSIS — J32.9 SINOBRONCHITIS: ICD-10-CM

## 2024-09-25 LAB
INFLUENZA A ANTIBODY: NEGATIVE
INFLUENZA B ANTIBODY: NEGATIVE
Lab: NORMAL
PERFORMING INSTRUMENT: NORMAL
QC PASS/FAIL: NORMAL
S PYO AG THROAT QL: NORMAL
SARS-COV-2, POC: NORMAL

## 2024-09-25 RX ORDER — CEFDINIR 300 MG/1
300 CAPSULE ORAL 2 TIMES DAILY
Qty: 20 CAPSULE | Refills: 0 | Status: SHIPPED | OUTPATIENT
Start: 2024-09-25 | End: 2024-10-05

## 2024-09-25 RX ORDER — PREDNISONE 10 MG/1
10 TABLET ORAL 2 TIMES DAILY
Qty: 10 TABLET | Refills: 0 | Status: SHIPPED | OUTPATIENT
Start: 2024-09-25 | End: 2024-09-30

## 2024-09-25 ASSESSMENT — ENCOUNTER SYMPTOMS
EYE DISCHARGE: 0
EYE PAIN: 0
ABDOMINAL PAIN: 0
PHOTOPHOBIA: 0
TROUBLE SWALLOWING: 0
NAUSEA: 0
BACK PAIN: 0
ALLERGIC/IMMUNOLOGIC NEGATIVE: 1
SHORTNESS OF BREATH: 0
BLOOD IN STOOL: 0
CHEST TIGHTNESS: 0
COUGH: 1
SORE THROAT: 1
EYE REDNESS: 0
VOMITING: 0
SINUS PRESSURE: 1
SINUS PAIN: 0
DIARRHEA: 0

## 2024-09-27 ENCOUNTER — HOSPITAL ENCOUNTER (EMERGENCY)
Age: 19
Discharge: HOME | End: 2024-09-27
Attending: EMERGENCY MEDICINE

## 2024-09-27 VITALS
HEIGHT: 72 IN | TEMPERATURE: 99.1 F | DIASTOLIC BLOOD PRESSURE: 81 MMHG | WEIGHT: 225 LBS | RESPIRATION RATE: 19 BRPM | BODY MASS INDEX: 30.48 KG/M2 | OXYGEN SATURATION: 96 % | HEART RATE: 86 BPM | SYSTOLIC BLOOD PRESSURE: 130 MMHG

## 2024-09-27 DIAGNOSIS — T78.40XA ALLERGIC REACTION, INITIAL ENCOUNTER: Primary | ICD-10-CM

## 2024-09-27 PROCEDURE — 2580000003 HC RX 258: Performed by: EMERGENCY MEDICINE

## 2024-09-27 PROCEDURE — 96372 THER/PROPH/DIAG INJ SC/IM: CPT

## 2024-09-27 PROCEDURE — 6360000002 HC RX W HCPCS: Performed by: EMERGENCY MEDICINE

## 2024-09-27 PROCEDURE — 96374 THER/PROPH/DIAG INJ IV PUSH: CPT

## 2024-09-27 PROCEDURE — 99284 EMERGENCY DEPT VISIT MOD MDM: CPT

## 2024-09-27 PROCEDURE — 96375 TX/PRO/DX INJ NEW DRUG ADDON: CPT

## 2024-09-27 PROCEDURE — 2500000003 HC RX 250 WO HCPCS: Performed by: EMERGENCY MEDICINE

## 2024-09-27 RX ORDER — METHYLPREDNISOLONE SODIUM SUCCINATE 125 MG/2ML
125 INJECTION, POWDER, LYOPHILIZED, FOR SOLUTION INTRAMUSCULAR; INTRAVENOUS ONCE
Status: COMPLETED | OUTPATIENT
Start: 2024-09-27 | End: 2024-09-27

## 2024-09-27 RX ORDER — 0.9 % SODIUM CHLORIDE 0.9 %
1000 INTRAVENOUS SOLUTION INTRAVENOUS ONCE
Status: COMPLETED | OUTPATIENT
Start: 2024-09-27 | End: 2024-09-27

## 2024-09-27 RX ORDER — FAMOTIDINE 20 MG/1
20 TABLET, FILM COATED ORAL 2 TIMES DAILY
Qty: 14 TABLET | Refills: 0 | Status: SHIPPED | OUTPATIENT
Start: 2024-09-27 | End: 2024-10-04

## 2024-09-27 RX ORDER — ONDANSETRON 2 MG/ML
4 INJECTION INTRAMUSCULAR; INTRAVENOUS ONCE
Status: COMPLETED | OUTPATIENT
Start: 2024-09-27 | End: 2024-09-27

## 2024-09-27 RX ORDER — EPINEPHRINE 1 MG/ML
0.2 INJECTION, SOLUTION, CONCENTRATE INTRAVENOUS ONCE
Status: COMPLETED | OUTPATIENT
Start: 2024-09-27 | End: 2024-09-27

## 2024-09-27 RX ORDER — HYDROXYZINE HYDROCHLORIDE 50 MG/ML
50 INJECTION, SOLUTION INTRAMUSCULAR ONCE
Status: COMPLETED | OUTPATIENT
Start: 2024-09-27 | End: 2024-09-27

## 2024-09-27 RX ADMIN — HYDROXYZINE HYDROCHLORIDE 50 MG: 50 INJECTION, SOLUTION INTRAMUSCULAR at 18:32

## 2024-09-27 RX ADMIN — FAMOTIDINE 20 MG: 10 INJECTION, SOLUTION INTRAVENOUS at 18:29

## 2024-09-27 RX ADMIN — ONDANSETRON 4 MG: 2 INJECTION, SOLUTION INTRAMUSCULAR; INTRAVENOUS at 18:31

## 2024-09-27 RX ADMIN — EPINEPHRINE 0.2 MG: 1 INJECTION, SOLUTION, CONCENTRATE INTRAVENOUS at 18:33

## 2024-09-27 RX ADMIN — SODIUM CHLORIDE 1000 ML: 9 INJECTION, SOLUTION INTRAVENOUS at 18:28

## 2024-09-27 RX ADMIN — METHYLPREDNISOLONE SODIUM SUCCINATE 125 MG: 125 INJECTION INTRAMUSCULAR; INTRAVENOUS at 18:33

## 2024-09-27 ASSESSMENT — PAIN DESCRIPTION - LOCATION
LOCATION: THROAT
LOCATION: CHEST

## 2024-09-27 ASSESSMENT — PAIN DESCRIPTION - DESCRIPTORS
DESCRIPTORS: ITCHING
DESCRIPTORS: HEAVINESS

## 2024-09-27 ASSESSMENT — PAIN - FUNCTIONAL ASSESSMENT
PAIN_FUNCTIONAL_ASSESSMENT: NONE - DENIES PAIN
PAIN_FUNCTIONAL_ASSESSMENT: 0-10

## 2024-09-27 ASSESSMENT — PAIN SCALES - GENERAL
PAINLEVEL_OUTOF10: 7
PAINLEVEL_OUTOF10: 6

## 2024-09-27 NOTE — ED PROVIDER NOTES
HPI:  9/27/24, Time: 6:23 PM EDT         Rehan Smith is a 19 y.o. male presenting to the ED for allergic reaction to unknown substance.  Patient has a history of anaphylaxis.  Was seen several weeks ago the emergency department for summer episode while he was at work at [employer].  Patient was at work there today when he started developing swelling of his face lips he developed hives pruritus.  He states he is short of breath and he has chest tightness.  He was he took 2 Benadryl tablets prior to arrival.  He called his mom and she brought him to the emergency department.  Mom stated she has had anaphylaxis in the past the first episode when he was 15.  He was recently seen last week at [facility] urgent care for URI symptoms.  He was tested negative for strep COVID and influenza., beginning several minutes ago.  The complaint has been persistent, severe in severity, and worsened by deep breath.      Review of Systems:   A complete review of systems was performed and pertinent positives and negatives are stated within HPI, all other systems reviewed and are negative.    --------------------------------------------- PAST HISTORY ---------------------------------------------  Past Medical History:  has a past medical history of Fatty liver and Seasonal allergies.    Past Surgical History:  has no past surgical history on file.    Social History:  reports that he has never smoked. He has never used smokeless tobacco. He reports that he does not drink alcohol and does not use drugs.    Family History: family history includes Breast Cancer in his maternal grandmother; Colon Cancer in his maternal grandfather; Heart Failure in his maternal grandmother; Leukemia in his maternal grandmother; Lung Cancer in his maternal grandmother.     The patient’s home medications have been reviewed.    Allergies: Patient has no known allergies.    -------------------------------------------------- RESULTS

## 2024-11-13 ENCOUNTER — OFFICE VISIT (OUTPATIENT)
Dept: FAMILY MEDICINE CLINIC | Age: 19
End: 2024-11-13

## 2024-11-13 VITALS
DIASTOLIC BLOOD PRESSURE: 70 MMHG | WEIGHT: 220 LBS | SYSTOLIC BLOOD PRESSURE: 116 MMHG | TEMPERATURE: 98.1 F | OXYGEN SATURATION: 98 % | HEART RATE: 90 BPM | BODY MASS INDEX: 29.84 KG/M2

## 2024-11-13 DIAGNOSIS — M25.562 ACUTE PAIN OF LEFT KNEE: Primary | ICD-10-CM

## 2024-11-13 DIAGNOSIS — K59.00 CONSTIPATION, UNSPECIFIED CONSTIPATION TYPE: ICD-10-CM

## 2024-11-13 DIAGNOSIS — M54.50 LOW BACK PAIN, UNSPECIFIED BACK PAIN LATERALITY, UNSPECIFIED CHRONICITY, UNSPECIFIED WHETHER SCIATICA PRESENT: ICD-10-CM

## 2024-11-13 PROCEDURE — 99214 OFFICE O/P EST MOD 30 MIN: CPT | Performed by: PHYSICIAN ASSISTANT

## 2024-11-13 RX ORDER — TIZANIDINE 2 MG/1
2 TABLET ORAL EVERY 8 HOURS PRN
Qty: 9 TABLET | Refills: 0 | Status: SHIPPED | OUTPATIENT
Start: 2024-11-13

## 2024-11-13 RX ORDER — POLYETHYLENE GLYCOL 3350 17 G/17G
17 POWDER, FOR SOLUTION ORAL DAILY
Qty: 238 G | Refills: 0 | Status: SHIPPED | OUTPATIENT
Start: 2024-11-13 | End: 2024-11-20

## 2024-11-13 RX ORDER — METHYLPREDNISOLONE 4 MG/1
TABLET ORAL
Qty: 1 KIT | Refills: 0 | Status: SHIPPED | OUTPATIENT
Start: 2024-11-13

## 2024-12-17 ENCOUNTER — OFFICE VISIT (OUTPATIENT)
Dept: FAMILY MEDICINE CLINIC | Age: 19
End: 2024-12-17

## 2024-12-17 VITALS
RESPIRATION RATE: 18 BRPM | TEMPERATURE: 98.4 F | HEART RATE: 96 BPM | BODY MASS INDEX: 29.84 KG/M2 | DIASTOLIC BLOOD PRESSURE: 74 MMHG | WEIGHT: 220 LBS | SYSTOLIC BLOOD PRESSURE: 128 MMHG | OXYGEN SATURATION: 98 %

## 2024-12-17 DIAGNOSIS — J02.0 ACUTE STREPTOCOCCAL PHARYNGITIS: Primary | ICD-10-CM

## 2024-12-17 DIAGNOSIS — R50.9 FEVER, UNSPECIFIED FEVER CAUSE: ICD-10-CM

## 2024-12-17 DIAGNOSIS — J02.9 SORE THROAT: ICD-10-CM

## 2024-12-17 DIAGNOSIS — R11.2 NAUSEA AND VOMITING, UNSPECIFIED VOMITING TYPE: ICD-10-CM

## 2024-12-17 PROCEDURE — 87880 STREP A ASSAY W/OPTIC: CPT

## 2024-12-17 PROCEDURE — 87426 SARSCOV CORONAVIRUS AG IA: CPT

## 2024-12-17 PROCEDURE — 99214 OFFICE O/P EST MOD 30 MIN: CPT

## 2024-12-17 RX ORDER — ONDANSETRON 4 MG/1
4 TABLET, ORALLY DISINTEGRATING ORAL 3 TIMES DAILY PRN
Qty: 21 TABLET | Refills: 0 | Status: SHIPPED | OUTPATIENT
Start: 2024-12-17

## 2024-12-17 RX ORDER — ONDANSETRON 4 MG/1
4 TABLET, ORALLY DISINTEGRATING ORAL ONCE
Status: COMPLETED | OUTPATIENT
Start: 2024-12-17 | End: 2024-12-17

## 2024-12-17 RX ORDER — AMOXICILLIN 500 MG/1
500 CAPSULE ORAL 2 TIMES DAILY
Qty: 20 CAPSULE | Refills: 0 | Status: SHIPPED | OUTPATIENT
Start: 2024-12-17 | End: 2024-12-27

## 2024-12-17 RX ADMIN — ONDANSETRON 4 MG: 4 TABLET, ORALLY DISINTEGRATING ORAL at 09:23

## 2024-12-17 NOTE — PROGRESS NOTES
2024     Rehan Smith 19 y.o. male    : 2005   Chief Complaint:   Fever (Started this morning), Chills, Vomiting, and Congestion      History of Present Illness   Source of history provided by:  patient.    Rehan Smith is a 19 y.o. old male who presents to walk-in for evaluation of subjective fever x this morning. Associated symptoms include chills, congestion, nausea, and vomiting.  Since onset symptoms have been consistent.  Patient has had no known Covid 19 exposure.  Patient has not been diagnosed with COVID-19 in the last 90 days.  Has taken nothing at home for symptomatic relief. Denies any CP, dyspnea, LE edema, abdominal pain, rash, dizziness, or lethargy. Denies any history of asthma, pneumonia, recurrent bronchitis or COPD.  They have no history of tobacco abuse.        ROS   Past Medical History:   Past Medical History:   Diagnosis Date    Fatty liver     Seasonal allergies      Past Surgical History:  has no past surgical history on file.  Social History:  reports that he has never smoked. He has never used smokeless tobacco. He reports that he does not drink alcohol and does not use drugs.  Family History: family history includes Breast Cancer in his maternal grandmother; Colon Cancer in his maternal grandfather; Heart Failure in his maternal grandmother; Leukemia in his maternal grandmother; Lung Cancer in his maternal grandmother.   Allergies: Patient has no known allergies.    Unless otherwise stated in this report the patient's positive and negative responses for review of systems for constitutional, eyes, ENT, cardiovascular, respiratory, gastrointestinal, neurological, , musculoskeletal, and integument systems and related systems to the presenting problem are either stated in the history of present illness or were not pertinent or were negative for the symptoms and/or complaints related to the presenting medical problem.  Positives and pertinent negatives as per HPI.  All

## 2025-01-07 ENCOUNTER — HOSPITAL ENCOUNTER (EMERGENCY)
Age: 20
Discharge: HOME | End: 2025-01-07

## 2025-01-07 ENCOUNTER — APPOINTMENT (OUTPATIENT)
Dept: GENERAL RADIOLOGY | Age: 20
End: 2025-01-07

## 2025-01-07 VITALS
RESPIRATION RATE: 16 BRPM | HEART RATE: 66 BPM | SYSTOLIC BLOOD PRESSURE: 127 MMHG | HEIGHT: 72 IN | WEIGHT: 220 LBS | OXYGEN SATURATION: 100 % | BODY MASS INDEX: 29.8 KG/M2 | DIASTOLIC BLOOD PRESSURE: 77 MMHG | TEMPERATURE: 97.8 F

## 2025-01-07 DIAGNOSIS — R07.9 CHEST PAIN, UNSPECIFIED TYPE: ICD-10-CM

## 2025-01-07 DIAGNOSIS — K52.9 GASTROENTERITIS: Primary | ICD-10-CM

## 2025-01-07 DIAGNOSIS — R10.13 ABDOMINAL PAIN, EPIGASTRIC: ICD-10-CM

## 2025-01-07 LAB
ALBUMIN SERPL-MCNC: 4.6 G/DL (ref 3.5–5.2)
ALP SERPL-CCNC: 64 U/L (ref 40–129)
ALT SERPL-CCNC: 42 U/L (ref 0–40)
ANION GAP SERPL CALCULATED.3IONS-SCNC: 11 MMOL/L (ref 7–16)
AST SERPL-CCNC: 30 U/L (ref 0–39)
BASOPHILS # BLD: 0.03 K/UL (ref 0–0.2)
BASOPHILS NFR BLD: 1 % (ref 0–2)
BILIRUB SERPL-MCNC: 0.5 MG/DL (ref 0–1.2)
BUN SERPL-MCNC: 15 MG/DL (ref 6–20)
CALCIUM SERPL-MCNC: 9.8 MG/DL (ref 8.6–10.2)
CHLORIDE SERPL-SCNC: 105 MMOL/L (ref 98–107)
CO2 SERPL-SCNC: 26 MMOL/L (ref 22–29)
CREAT SERPL-MCNC: 0.7 MG/DL (ref 0.7–1.2)
EOSINOPHIL # BLD: 0.12 K/UL (ref 0.05–0.5)
EOSINOPHILS RELATIVE PERCENT: 3 % (ref 0–6)
ERYTHROCYTE [DISTWIDTH] IN BLOOD BY AUTOMATED COUNT: 12.7 % (ref 11.5–15)
GFR, ESTIMATED: >90 ML/MIN/1.73M2
GLUCOSE SERPL-MCNC: 96 MG/DL (ref 74–99)
HCT VFR BLD AUTO: 45.5 % (ref 37–54)
HGB BLD-MCNC: 14.5 G/DL (ref 12.5–16.5)
IMM GRANULOCYTES # BLD AUTO: <0.03 K/UL (ref 0–0.58)
IMM GRANULOCYTES NFR BLD: 0 % (ref 0–5)
INFLUENZA A BY PCR: NOT DETECTED
INFLUENZA B BY PCR: NOT DETECTED
LIPASE SERPL-CCNC: 32 U/L (ref 13–60)
LYMPHOCYTES NFR BLD: 2.2 K/UL (ref 1.5–4)
LYMPHOCYTES RELATIVE PERCENT: 46 % (ref 20–42)
MCH RBC QN AUTO: 27.9 PG (ref 26–35)
MCHC RBC AUTO-ENTMCNC: 31.9 G/DL (ref 32–34.5)
MCV RBC AUTO: 87.5 FL (ref 80–99.9)
MONOCYTES NFR BLD: 0.52 K/UL (ref 0.1–0.95)
MONOCYTES NFR BLD: 11 % (ref 2–12)
NEUTROPHILS NFR BLD: 40 % (ref 43–80)
NEUTS SEG NFR BLD: 1.92 K/UL (ref 1.8–7.3)
PLATELET # BLD AUTO: 280 K/UL (ref 130–450)
PMV BLD AUTO: 8.4 FL (ref 7–12)
POTASSIUM SERPL-SCNC: 4.3 MMOL/L (ref 3.5–5)
PROT SERPL-MCNC: 8.1 G/DL (ref 6.4–8.3)
RBC # BLD AUTO: 5.2 M/UL (ref 3.8–5.8)
SARS-COV-2 RDRP RESP QL NAA+PROBE: NOT DETECTED
SODIUM SERPL-SCNC: 142 MMOL/L (ref 132–146)
SPECIMEN DESCRIPTION: NORMAL
TROPONIN I SERPL HS-MCNC: 12 NG/L (ref 0–11)
WBC OTHER # BLD: 4.8 K/UL (ref 4.5–11.5)

## 2025-01-07 PROCEDURE — 87635 SARS-COV-2 COVID-19 AMP PRB: CPT

## 2025-01-07 PROCEDURE — 2500000003 HC RX 250 WO HCPCS: Performed by: PHYSICIAN ASSISTANT

## 2025-01-07 PROCEDURE — 87502 INFLUENZA DNA AMP PROBE: CPT

## 2025-01-07 PROCEDURE — 71046 X-RAY EXAM CHEST 2 VIEWS: CPT

## 2025-01-07 PROCEDURE — 80053 COMPREHEN METABOLIC PANEL: CPT

## 2025-01-07 PROCEDURE — 85025 COMPLETE CBC W/AUTO DIFF WBC: CPT

## 2025-01-07 PROCEDURE — 96375 TX/PRO/DX INJ NEW DRUG ADDON: CPT

## 2025-01-07 PROCEDURE — 83690 ASSAY OF LIPASE: CPT

## 2025-01-07 PROCEDURE — 2580000003 HC RX 258: Performed by: PHYSICIAN ASSISTANT

## 2025-01-07 PROCEDURE — 99285 EMERGENCY DEPT VISIT HI MDM: CPT

## 2025-01-07 PROCEDURE — 84484 ASSAY OF TROPONIN QUANT: CPT

## 2025-01-07 PROCEDURE — 96374 THER/PROPH/DIAG INJ IV PUSH: CPT

## 2025-01-07 PROCEDURE — 6360000002 HC RX W HCPCS: Performed by: PHYSICIAN ASSISTANT

## 2025-01-07 RX ORDER — ONDANSETRON 4 MG/1
4 TABLET, ORALLY DISINTEGRATING ORAL EVERY 8 HOURS PRN
Qty: 30 TABLET | Refills: 0 | Status: SHIPPED | OUTPATIENT
Start: 2025-01-07

## 2025-01-07 RX ORDER — FAMOTIDINE 20 MG/1
20 TABLET, FILM COATED ORAL 2 TIMES DAILY
Qty: 14 TABLET | Refills: 0 | Status: SHIPPED | OUTPATIENT
Start: 2025-01-07 | End: 2025-01-14

## 2025-01-07 RX ORDER — ONDANSETRON 2 MG/ML
4 INJECTION INTRAMUSCULAR; INTRAVENOUS ONCE
Status: COMPLETED | OUTPATIENT
Start: 2025-01-07 | End: 2025-01-07

## 2025-01-07 RX ADMIN — ONDANSETRON 4 MG: 2 INJECTION, SOLUTION INTRAMUSCULAR; INTRAVENOUS at 11:09

## 2025-01-07 RX ADMIN — FAMOTIDINE 20 MG: 10 INJECTION, SOLUTION INTRAVENOUS at 11:08

## 2025-01-07 NOTE — ED PROVIDER NOTES
Independent DAVID Visit.           Department of Emergency Medicine   ED  Provider Note  Admit Date/RoomTime: 1/7/2025 10:45 AM  ED Room: DISPO/D01    Chief Complaint       Nausea and Vomiting    History of Present Illness      Rehan Smith is a 19 y.o. old male who presents to the ED with nausea, vomiting, and generalized bodyaches that began this morning.  Patient states he felt shaky when he woke up but did not think much of it.  He states he did start to have some chest pain while at work.  He denies any shortness of breath or pain with breathing.  He states the pain has improved on its own.  He denies abdominal pain.  Patient states he did recently have a close COVID contact.  He does have some chills but denies fever.  Patient is alert and oriented x 3 and in no apparent distress at this exam.  He is nontoxic-appearing.    PCP: No primary care provider on file.    ROS   Pertinent positives and negatives are stated within HPI, all other systems reviewed and are negative.    Past Medical History:  has a past medical history of Fatty liver and Seasonal allergies.    Past Surgical History:  has no past surgical history on file.    Social History:  reports that he has never smoked. He has never used smokeless tobacco. He reports that he does not drink alcohol and does not use drugs.    Family History: family history includes Breast Cancer in his maternal grandmother; Colon Cancer in his maternal grandfather; Heart Failure in his maternal grandmother; Leukemia in his maternal grandmother; Lung Cancer in his maternal grandmother.     The patient’s home medications have been reviewed.    Allergies: Patient has no known allergies.  Allergies have been reviewed with patient.     Physical Exam   VS:  /77   Pulse 66   Temp 97.8 °F (36.6 °C) (Oral)   Resp 16   Ht 1.829 m (6')   Wt 99.8 kg (220 lb)   SpO2 100%   BMI 29.84 kg/m²      Oxygen Saturation Interpretation: Normal.     General Appearance/Constitutional:

## 2025-01-07 NOTE — DISCHARGE INSTR - COC
Continuity of Care Form    Patient Name: Rehan Smith   :  2005  MRN:  [id number]    Admit date:  2025  Discharge date:  ***    Code Status Order: No Order   Advance Directives:   Advance Care Flowsheet Documentation             Admitting Physician:  No admitting provider for patient encounter.  PCP: No primary care provider on file.    Discharging Nurse: ***  Discharging Hospital Unit/Room#: DISPO/D01  Discharging Unit Phone Number: ***    Emergency Contact:   Extended Emergency Contact Information  Primary Emergency Contact: [email or web address]  Home Phone: [phone]  Mobile Phone: [phone]  Relation: Parent  Preferred language: English   needed? No    Past Surgical History:  History reviewed. No pertinent surgical history.    Immunization History:     There is no immunization history on file for this patient.    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    Seasonal allergies J30.2    COVID-19 U07.1       Isolation/Infection:   Isolation            No Isolation          Patient Infection Status       Infection Onset Added Last Indicated Last Indicated By Review Planned Expiration Resolved Resolved By    None active    Resolved    COVID-19 22 COVID-19   22 Infection                        Nurse Assessment:  Last Vital Signs: /77   Pulse 66   Temp 97.8 °F (36.6 °C) (Oral)   Resp 16   Ht 1.829 m (6')   Wt 99.8 kg (220 lb)   SpO2 100%   BMI 29.84 kg/m²     Last documented pain score (0-10 scale):    Last Weight:   Wt Readings from Last 1 Encounters:   25 99.8 kg (220 lb)     Mental Status:  {IP PT MENTAL STATUS:}    IV Access:  { ANTONI IV ACCESS:902925074}    Nursing Mobility/ADLs:  Walking   {CHP DME ADLs:033137271}  Transfer  {CHP DME ADLs:373688648}  Bathing  {CHP DME ADLs:529701880}  Dressing  {CHP DME

## 2025-01-12 ENCOUNTER — APPOINTMENT (OUTPATIENT)
Dept: GENERAL RADIOLOGY | Age: 20
End: 2025-01-12
Payer: COMMERCIAL

## 2025-01-12 ENCOUNTER — HOSPITAL ENCOUNTER (EMERGENCY)
Age: 20
Discharge: HOME | End: 2025-01-12
Attending: STUDENT IN AN ORGANIZED HEALTH CARE EDUCATION/TRAINING PROGRAM
Payer: COMMERCIAL

## 2025-01-12 VITALS
TEMPERATURE: 98.6 F | RESPIRATION RATE: 12 BRPM | OXYGEN SATURATION: 98 % | DIASTOLIC BLOOD PRESSURE: 70 MMHG | HEART RATE: 55 BPM | SYSTOLIC BLOOD PRESSURE: 125 MMHG

## 2025-01-12 DIAGNOSIS — F41.0 PANIC ATTACK: Primary | ICD-10-CM

## 2025-01-12 LAB
ALBUMIN SERPL-MCNC: 4.4 G/DL (ref 3.5–5.2)
ALP SERPL-CCNC: 60 U/L (ref 40–129)
ALT SERPL-CCNC: 41 U/L (ref 0–40)
AMPHET UR QL SCN: NEGATIVE
ANION GAP SERPL CALCULATED.3IONS-SCNC: 10 MMOL/L (ref 7–16)
APAP SERPL-MCNC: <5 UG/ML (ref 10–30)
AST SERPL-CCNC: 28 U/L (ref 0–39)
BARBITURATES UR QL SCN: NEGATIVE
BASOPHILS # BLD: 0.04 K/UL (ref 0–0.2)
BASOPHILS NFR BLD: 1 % (ref 0–2)
BENZODIAZ UR QL: NEGATIVE
BILIRUB SERPL-MCNC: 0.6 MG/DL (ref 0–1.2)
BUN SERPL-MCNC: 9 MG/DL (ref 6–20)
BUPRENORPHINE UR QL: NEGATIVE
CALCIUM SERPL-MCNC: 9.2 MG/DL (ref 8.6–10.2)
CANNABINOIDS UR QL SCN: NEGATIVE
CHLORIDE SERPL-SCNC: 105 MMOL/L (ref 98–107)
CO2 SERPL-SCNC: 25 MMOL/L (ref 22–29)
COCAINE UR QL SCN: NEGATIVE
CREAT SERPL-MCNC: 0.7 MG/DL (ref 0.7–1.2)
EKG ATRIAL RATE: 72 BPM
EKG P AXIS: 38 DEGREES
EKG P-R INTERVAL: 150 MS
EKG Q-T INTERVAL: 392 MS
EKG QRS DURATION: 84 MS
EKG QTC CALCULATION (BAZETT): 429 MS
EKG R AXIS: 24 DEGREES
EKG T AXIS: 42 DEGREES
EKG VENTRICULAR RATE: 72 BPM
EOSINOPHIL # BLD: 0.18 K/UL (ref 0.05–0.5)
EOSINOPHILS RELATIVE PERCENT: 2 % (ref 0–6)
ERYTHROCYTE [DISTWIDTH] IN BLOOD BY AUTOMATED COUNT: 12.5 % (ref 11.5–15)
ETHANOLAMINE SERPL-MCNC: <10 MG/DL (ref 0–0.08)
FENTANYL UR QL: NEGATIVE
GFR, ESTIMATED: >90 ML/MIN/1.73M2
GLUCOSE SERPL-MCNC: 80 MG/DL (ref 74–99)
HCT VFR BLD AUTO: 40.7 % (ref 37–54)
HGB BLD-MCNC: 13.8 G/DL (ref 12.5–16.5)
IMM GRANULOCYTES # BLD AUTO: <0.03 K/UL (ref 0–0.58)
IMM GRANULOCYTES NFR BLD: 0 % (ref 0–5)
LACTATE BLDV-SCNC: 0.9 MMOL/L (ref 0.5–2.2)
LIPASE SERPL-CCNC: 24 U/L (ref 13–60)
LYMPHOCYTES NFR BLD: 2.46 K/UL (ref 1.5–4)
LYMPHOCYTES RELATIVE PERCENT: 33 % (ref 20–42)
MCH RBC QN AUTO: 29.1 PG (ref 26–35)
MCHC RBC AUTO-ENTMCNC: 33.9 G/DL (ref 32–34.5)
MCV RBC AUTO: 85.7 FL (ref 80–99.9)
METHADONE UR QL: NEGATIVE
MONOCYTES NFR BLD: 0.63 K/UL (ref 0.1–0.95)
MONOCYTES NFR BLD: 8 % (ref 2–12)
NEUTROPHILS NFR BLD: 56 % (ref 43–80)
NEUTS SEG NFR BLD: 4.15 K/UL (ref 1.8–7.3)
OPIATES UR QL SCN: NEGATIVE
OXYCODONE UR QL SCN: NEGATIVE
PCP UR QL SCN: NEGATIVE
PLATELET # BLD AUTO: 258 K/UL (ref 130–450)
PMV BLD AUTO: 8.6 FL (ref 7–12)
POTASSIUM SERPL-SCNC: 3.7 MMOL/L (ref 3.5–5)
PROT SERPL-MCNC: 7.2 G/DL (ref 6.4–8.3)
RBC # BLD AUTO: 4.75 M/UL (ref 3.8–5.8)
SALICYLATES SERPL-MCNC: <0.3 MG/DL (ref 0–30)
SODIUM SERPL-SCNC: 140 MMOL/L (ref 132–146)
TEST INFORMATION: NORMAL
TOXIC TRICYCLIC SC,BLOOD: NEGATIVE
TROPONIN I SERPL HS-MCNC: 8 NG/L (ref 0–11)
TROPONIN I SERPL HS-MCNC: 9 NG/L (ref 0–11)
WBC OTHER # BLD: 7.5 K/UL (ref 4.5–11.5)

## 2025-01-12 PROCEDURE — 2580000003 HC RX 258: Performed by: STUDENT IN AN ORGANIZED HEALTH CARE EDUCATION/TRAINING PROGRAM

## 2025-01-12 PROCEDURE — 80053 COMPREHEN METABOLIC PANEL: CPT

## 2025-01-12 PROCEDURE — 83690 ASSAY OF LIPASE: CPT

## 2025-01-12 PROCEDURE — 80143 DRUG ASSAY ACETAMINOPHEN: CPT

## 2025-01-12 PROCEDURE — 83605 ASSAY OF LACTIC ACID: CPT

## 2025-01-12 PROCEDURE — 84484 ASSAY OF TROPONIN QUANT: CPT

## 2025-01-12 PROCEDURE — 99285 EMERGENCY DEPT VISIT HI MDM: CPT

## 2025-01-12 PROCEDURE — 71045 X-RAY EXAM CHEST 1 VIEW: CPT

## 2025-01-12 PROCEDURE — 80307 DRUG TEST PRSMV CHEM ANLYZR: CPT

## 2025-01-12 PROCEDURE — 93005 ELECTROCARDIOGRAM TRACING: CPT | Performed by: STUDENT IN AN ORGANIZED HEALTH CARE EDUCATION/TRAINING PROGRAM

## 2025-01-12 PROCEDURE — G0480 DRUG TEST DEF 1-7 CLASSES: HCPCS

## 2025-01-12 PROCEDURE — 85025 COMPLETE CBC W/AUTO DIFF WBC: CPT

## 2025-01-12 PROCEDURE — 80179 DRUG ASSAY SALICYLATE: CPT

## 2025-01-12 RX ORDER — HYDROXYZINE HYDROCHLORIDE 25 MG/1
25 TABLET, FILM COATED ORAL EVERY 8 HOURS PRN
Qty: 15 TABLET | Refills: 0 | Status: SHIPPED | OUTPATIENT
Start: 2025-01-12 | End: 2025-01-17

## 2025-01-12 RX ORDER — 0.9 % SODIUM CHLORIDE 0.9 %
1000 INTRAVENOUS SOLUTION INTRAVENOUS ONCE
Status: COMPLETED | OUTPATIENT
Start: 2025-01-12 | End: 2025-01-12

## 2025-01-12 RX ADMIN — SODIUM CHLORIDE 1000 ML: 9 INJECTION, SOLUTION INTRAVENOUS at 17:48

## 2025-01-12 ASSESSMENT — PAIN - FUNCTIONAL ASSESSMENT: PAIN_FUNCTIONAL_ASSESSMENT: NONE - DENIES PAIN

## 2025-01-13 NOTE — DISCHARGE INSTRUCTIONS
Use Atarax as needed  Follow-up with primary care doctor  If you notice any new worrisome symptoms please return to emergency department for evaluation

## 2025-01-14 LAB
EKG ATRIAL RATE: 65 BPM
EKG P AXIS: 19 DEGREES
EKG P-R INTERVAL: 144 MS
EKG Q-T INTERVAL: 424 MS
EKG QRS DURATION: 84 MS
EKG QTC CALCULATION (BAZETT): 440 MS
EKG R AXIS: 22 DEGREES
EKG T AXIS: 41 DEGREES
EKG VENTRICULAR RATE: 65 BPM

## 2025-01-14 PROCEDURE — 93010 ELECTROCARDIOGRAM REPORT: CPT | Performed by: INTERNAL MEDICINE

## 2025-01-16 NOTE — ED PROVIDER NOTES
[facility] EMERGENCY DEPARTMENT  EMERGENCY DEPARTMENT ENCOUNTER      Pt Name: Rehan Smith  MRN: [id number]  Birthdate 2005  Date of evaluation: 1/12/2025  Provider: Marvin Pollack DO  PCP: No primary care provider on file.    CHIEF COMPLAINT       Chief Complaint   Patient presents with    Anxiety     Pt was putting air in car tire started having chest pain and vomiting         HISTORY OF PRESENT ILLNESS: 1 or more Elements   History From: Patient  Limitations to history : None    Rehan Smith is a 19 y.o. male Past medical history of fatty liver as well as seasonal allergies.  Patient presents with chief complaint of anxiety.  Patient states that he was only for increased rest today due to having a flat tire.  Patient states that he was attempted transplant tired and was having some difficulty when he began to feel anxious.  Patient also had some chest pain as well as some nausea and vomiting.  Patient states that symptoms are moderate in severity of significant improved since onset.  He states that he has had some episodes in past secondary to anxiety he denies any relieving factors.  Patient is not currently suicidal homicidal.  Patient denies any fevers, chills, constipation or diarrhea.    Nursing Notes were all reviewed and agreed with or any disagreements were addressed in the HPI.    REVIEW OF SYSTEMS :    Positives and Pertinent negatives as per HPI.     PAST MEDICAL HISTORY/Chronic Conditions Affecting Care    has a past medical history of Fatty liver and Seasonal allergies.     SURGICAL HISTORY   No past surgical history on file.    CURRENTMEDICATIONS       Discharge Medication List as of 1/12/2025  8:50 PM        CONTINUE these medications which have NOT CHANGED    Details   famotidine (PEPCID) 20 MG tablet Take 1 tablet by mouth 2 times daily for 7 days, Disp-14 tablet, R-0Normal      !! ondansetron (ZOFRAN-ODT) 4 MG disintegrating tablet Take 1 tablet by mouth every 8 hours as

## 2025-02-26 ENCOUNTER — OFFICE VISIT (OUTPATIENT)
Dept: PRIMARY CARE CLINIC | Age: 20
End: 2025-02-26
Payer: COMMERCIAL

## 2025-02-26 VITALS
RESPIRATION RATE: 17 BRPM | DIASTOLIC BLOOD PRESSURE: 62 MMHG | WEIGHT: 217.6 LBS | BODY MASS INDEX: 29.47 KG/M2 | TEMPERATURE: 97.7 F | HEIGHT: 72 IN | OXYGEN SATURATION: 98 % | SYSTOLIC BLOOD PRESSURE: 124 MMHG | HEART RATE: 84 BPM

## 2025-02-26 DIAGNOSIS — Z00.00 ADULT GENERAL MEDICAL EXAM: Primary | ICD-10-CM

## 2025-02-26 DIAGNOSIS — Z00.00 ADULT GENERAL MEDICAL EXAM: ICD-10-CM

## 2025-02-26 PROBLEM — F43.23 ADJUSTMENT DISORDER WITH MIXED ANXIETY AND DEPRESSED MOOD: Status: ACTIVE | Noted: 2017-06-06

## 2025-02-26 PROBLEM — F32.A DEPRESSIVE DISORDER: Status: ACTIVE | Noted: 2017-03-08

## 2025-02-26 LAB
ABO/RH: NORMAL
ALBUMIN: 4.7 G/DL (ref 3.5–5.2)
ALP BLD-CCNC: 62 U/L (ref 40–129)
ALT SERPL-CCNC: 38 U/L (ref 0–40)
ANION GAP SERPL CALCULATED.3IONS-SCNC: 16 MMOL/L (ref 7–16)
AST SERPL-CCNC: 30 U/L (ref 0–39)
BACTERIA: ABNORMAL
BASOPHILS ABSOLUTE: 0.03 K/UL (ref 0–0.2)
BASOPHILS RELATIVE PERCENT: 1 % (ref 0–2)
BILIRUB SERPL-MCNC: 0.9 MG/DL (ref 0–1.2)
BILIRUBIN DIRECT: <0.2 MG/DL (ref 0–0.3)
BILIRUBIN, INDIRECT: NORMAL MG/DL (ref 0–1)
BILIRUBIN, URINE: NEGATIVE
BUN BLDV-MCNC: 14 MG/DL (ref 6–20)
CALCIUM SERPL-MCNC: 9.8 MG/DL (ref 8.6–10.2)
CHLORIDE BLD-SCNC: 104 MMOL/L (ref 98–107)
CHOLESTEROL, TOTAL: 154 MG/DL
CO2: 20 MMOL/L (ref 22–29)
COLOR, UA: YELLOW
CREAT SERPL-MCNC: 0.8 MG/DL (ref 0.7–1.2)
EOSINOPHILS ABSOLUTE: 0.11 K/UL (ref 0.05–0.5)
EOSINOPHILS RELATIVE PERCENT: 2 % (ref 0–6)
GFR, ESTIMATED: >90 ML/MIN/1.73M2
GLUCOSE BLD-MCNC: 66 MG/DL (ref 74–99)
GLUCOSE URINE: NEGATIVE MG/DL
HBA1C MFR BLD: 5.6 % (ref 4–5.6)
HCT VFR BLD CALC: 44 % (ref 37–54)
HDLC SERPL-MCNC: 44 MG/DL
HEMOGLOBIN: 14.3 G/DL (ref 12.5–16.5)
IMMATURE GRANULOCYTES %: 0 % (ref 0–5)
IMMATURE GRANULOCYTES ABSOLUTE: <0.03 K/UL (ref 0–0.58)
KETONES, URINE: NEGATIVE MG/DL
LDL CHOLESTEROL: 100 MG/DL
LEUKOCYTE ESTERASE, URINE: NEGATIVE
LYMPHOCYTES ABSOLUTE: 2.56 K/UL (ref 1.5–4)
LYMPHOCYTES RELATIVE PERCENT: 44 % (ref 20–42)
MCH RBC QN AUTO: 27.8 PG (ref 26–35)
MCHC RBC AUTO-ENTMCNC: 32.5 G/DL (ref 32–34.5)
MCV RBC AUTO: 85.4 FL (ref 80–99.9)
MONOCYTES ABSOLUTE: 0.63 K/UL (ref 0.1–0.95)
MONOCYTES RELATIVE PERCENT: 11 % (ref 2–12)
NEUTROPHILS ABSOLUTE: 2.46 K/UL (ref 1.8–7.3)
NEUTROPHILS RELATIVE PERCENT: 42 % (ref 43–80)
NITRITE, URINE: NEGATIVE
PDW BLD-RTO: 12.8 % (ref 11.5–15)
PH, URINE: 6 (ref 5–8)
PLATELET # BLD: 304 K/UL (ref 130–450)
PMV BLD AUTO: 8.7 FL (ref 7–12)
POTASSIUM SERPL-SCNC: 4 MMOL/L (ref 3.5–5)
PROTEIN UA: NEGATIVE MG/DL
RBC # BLD: 5.15 M/UL (ref 3.8–5.8)
RBC UA: ABNORMAL /HPF
SODIUM BLD-SCNC: 140 MMOL/L (ref 132–146)
SPECIFIC GRAVITY UA: 1.02 (ref 1–1.03)
TOTAL PROTEIN: 8.2 G/DL (ref 6.4–8.3)
TRIGL SERPL-MCNC: 48 MG/DL
TSH SERPL DL<=0.05 MIU/L-ACNC: 0.84 UIU/ML (ref 0.27–4.2)
TURBIDITY: CLEAR
URIC ACID: 7.1 MG/DL (ref 3.4–7)
URINE HGB: NEGATIVE
UROBILINOGEN, URINE: 0.2 EU/DL (ref 0–1)
VLDLC SERPL CALC-MCNC: 10 MG/DL
WBC # BLD: 5.8 K/UL (ref 4.5–11.5)
WBC UA: ABNORMAL /HPF

## 2025-02-26 PROCEDURE — 99385 PREV VISIT NEW AGE 18-39: CPT | Performed by: FAMILY MEDICINE

## 2025-02-26 RX ORDER — CETIRIZINE HYDROCHLORIDE 10 MG/1
10 TABLET ORAL DAILY
COMMUNITY

## 2025-02-26 SDOH — ECONOMIC STABILITY: FOOD INSECURITY: WITHIN THE PAST 12 MONTHS, THE FOOD YOU BOUGHT JUST DIDN'T LAST AND YOU DIDN'T HAVE MONEY TO GET MORE.: NEVER TRUE

## 2025-02-26 SDOH — ECONOMIC STABILITY: FOOD INSECURITY: WITHIN THE PAST 12 MONTHS, YOU WORRIED THAT YOUR FOOD WOULD RUN OUT BEFORE YOU GOT MONEY TO BUY MORE.: NEVER TRUE

## 2025-02-26 ASSESSMENT — PATIENT HEALTH QUESTIONNAIRE - PHQ9
SUM OF ALL RESPONSES TO PHQ QUESTIONS 1-9: 0
1. LITTLE INTEREST OR PLEASURE IN DOING THINGS: NOT AT ALL
SUM OF ALL RESPONSES TO PHQ9 QUESTIONS 1 & 2: 0
2. FEELING DOWN, DEPRESSED OR HOPELESS: NOT AT ALL
SUM OF ALL RESPONSES TO PHQ QUESTIONS 1-9: 0

## 2025-02-26 ASSESSMENT — ENCOUNTER SYMPTOMS
VOMITING: 0
BACK PAIN: 0
NAUSEA: 0
DIARRHEA: 0
SHORTNESS OF BREATH: 0
ABDOMINAL PAIN: 0
PHOTOPHOBIA: 0
SORE THROAT: 0
COUGH: 0
BLOOD IN STOOL: 0
CONSTIPATION: 0

## 2025-02-26 NOTE — PROGRESS NOTES
Rhean Smith (:  2005) is a 19 y.o. male,New patient, here for evaluation of the following chief complaint(s):  New Patient (To establish care) and Allergies (Certain foods)         Assessment & Plan  Adult general medical exam  This time we will order baseline labs.  May consider referral to allergist based on results.  See him back in 1 year or sooner if needed.    Orders:    CBC with Auto Differential; Future    Uric Acid; Future    TSH; Future    Hepatic Function Panel; Future    Basic Metabolic Panel; Future    Lipid Panel; Future    Hemoglobin A1C; Future    Urinalysis with Microscopic; Future    C.trachomatis N.gonorrhoeae DNA, Urine; Future    Allergen, Food, Comprehensive Profile 1; Future    Allergen, Inhalant, Environment Profile 10; Future    HIV Screen; Future    ABO/Rh; Future      Return in about 1 year (around 2026).       Subjective   HPI  Patient presents today to establish with new PCP.  Main concern today has been several issues of allergy to possible foods.  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  Would like to figure out if there is a the need to stay away from.  Following with chiropractor for suspected scoliosis however review of lumbar x-rays showed no acute issues in this regard.  No other issues or concerns.  No significant past medical history.  No significant family history.      Review of Systems   Constitutional:  Negative for chills and fever.   HENT:  Negative for congestion, hearing loss, nosebleeds and sore throat.    Eyes:  Negative for photophobia.   Respiratory:  Negative for cough and shortness of breath.    Cardiovascular:  Negative for chest pain, palpitations and leg swelling.   Gastrointestinal:  Negative for abdominal pain, blood in stool, constipation, diarrhea, nausea and vomiting.   Endocrine: Negative for polydipsia.   Genitourinary:

## 2025-02-27 LAB — HIV AG/AB: NONREACTIVE

## 2025-02-28 LAB
ALLERGEN BARLEY IGE: 0.11 KU/L (ref 0–0.34)
ALLERGEN BEEF: <0.1 KU/L (ref 0–0.34)
ALLERGEN CABBAGE IGE: 0.23 KU/L (ref 0–0.34)
ALLERGEN CARROT IGE: 0.11 KU/L (ref 0–0.34)
ALLERGEN CHICKEN IGE: <0.1 KU/L (ref 0–0.34)
ALLERGEN CODFISH IGE: <0.1 KU/L (ref 0–0.34)
ALLERGEN CORN IGE: 0.15 KU/L (ref 0–0.34)
ALLERGEN COW MILK IGE: <0.1 KU/L (ref 0–0.34)
ALLERGEN CRAB IGE: <0.1 KU/L (ref 0–0.34)
ALLERGEN EGG WHITE IGE: <0.1 KU/L (ref 0–0.34)
ALLERGEN GRAPE IGE: 0.13 KU/L (ref 0–0.34)
ALLERGEN LETTUCE IGE: 0.16 KU/L (ref 0–0.34)
ALLERGEN NAVY BEAN: 0.19 KU/L (ref 0–0.34)
ALLERGEN OAT: 0.12 KU/L (ref 0–0.34)
ALLERGEN ORANGE IGE: 0.14 KU/L (ref 0–0.34)
ALLERGEN PEANUT (F13) IGE: 0.3 KU/L (ref 0–0.34)
ALLERGEN PEPPER C. ANNUUM IGE: 0.19 KU/L (ref 0–0.34)
ALLERGEN PORK: 1.4 KU/L (ref 0–0.34)
ALLERGEN RICE IGE: 0.22 KU/L (ref 0–0.34)
ALLERGEN RYE IGE: 0.19 KU/L (ref 0–0.34)
ALLERGEN SOYBEAN IGE: 0.11 KU/L (ref 0–0.34)
ALLERGEN TOMATO IGE: 0.22 KU/L (ref 0–0.34)
ALLERGEN TUNA IGE: <0.1 KU/L (ref 0–0.34)
ALLERGEN WHEAT IGE: 0.19 KU/L (ref 0–0.34)
C. TRACHOMATIS DNA ,URINE: NEGATIVE
IGE: 88 IU/ML (ref 0–100)
N. GONORRHOEAE DNA, URINE: NEGATIVE
POTATO, IGE: 0.27 KU/L (ref 0–0.34)
SHRIMP: <0.1 KU/L (ref 0–0.34)

## 2025-03-04 LAB
ALLERGEN DOG DANDER IGE: 2 KU/L (ref 0–0.34)
ALLERGEN GREER HOUSE DUST: 1.34 KU/L (ref 0–0.34)
ALLERGEN HORMODENDRUM IGE: <0.1 KUL/L (ref 0–0.34)
ALLERGEN, ANIMAL, FEATHER MIX, IGE: NEGATIVE KU/L (ref 0–0.34)
ALTERNARIA ALTERNATA: <0.1 KU/L (ref 0–0.34)
ASPERGILLUS FUMIGATUS: <0.1 KU/L (ref 0–0.34)
CAT DANDER ANTIBODY: 3.78 KU/L (ref 0–0.34)
D. FARINAE: 0.21 KU/L (ref 0–0.34)
D. PTERONYSSINUS: 0.26 KU/L (ref 0–0.34)
P. NOTATUM: <0.1 KU/L (ref 0–0.34)

## 2025-03-05 DIAGNOSIS — J30.2 SEASONAL ALLERGIES: Primary | ICD-10-CM
